# Patient Record
Sex: MALE | Race: WHITE | Employment: FULL TIME | URBAN - METROPOLITAN AREA
[De-identification: names, ages, dates, MRNs, and addresses within clinical notes are randomized per-mention and may not be internally consistent; named-entity substitution may affect disease eponyms.]

---

## 2019-05-31 ENCOUNTER — OFFICE VISIT (OUTPATIENT)
Dept: FAMILY MEDICINE | Facility: CLINIC | Age: 49
End: 2019-05-31
Payer: COMMERCIAL

## 2019-05-31 VITALS
SYSTOLIC BLOOD PRESSURE: 125 MMHG | WEIGHT: 218.13 LBS | HEART RATE: 81 BPM | DIASTOLIC BLOOD PRESSURE: 86 MMHG | HEIGHT: 65 IN | BODY MASS INDEX: 36.34 KG/M2 | OXYGEN SATURATION: 96 % | TEMPERATURE: 98.3 F

## 2019-05-31 DIAGNOSIS — R73.01 IMPAIRED FASTING GLUCOSE: ICD-10-CM

## 2019-05-31 DIAGNOSIS — E66.9 OBESITY, UNSPECIFIED CLASSIFICATION, UNSPECIFIED OBESITY TYPE, UNSPECIFIED WHETHER SERIOUS COMORBIDITY PRESENT: ICD-10-CM

## 2019-05-31 DIAGNOSIS — Z12.5 SCREENING FOR PROSTATE CANCER: ICD-10-CM

## 2019-05-31 DIAGNOSIS — R53.83 FATIGUE, UNSPECIFIED TYPE: ICD-10-CM

## 2019-05-31 DIAGNOSIS — Z91.09 ENVIRONMENTAL ALLERGIES: ICD-10-CM

## 2019-05-31 DIAGNOSIS — G47.00 INSOMNIA, UNSPECIFIED TYPE: ICD-10-CM

## 2019-05-31 DIAGNOSIS — R06.83 SNORING: Primary | ICD-10-CM

## 2019-05-31 LAB — HBA1C MFR BLD: 6.7 % (ref 0–5.6)

## 2019-05-31 PROCEDURE — 83036 HEMOGLOBIN GLYCOSYLATED A1C: CPT | Performed by: FAMILY MEDICINE

## 2019-05-31 PROCEDURE — 84443 ASSAY THYROID STIM HORMONE: CPT | Performed by: FAMILY MEDICINE

## 2019-05-31 PROCEDURE — 36415 COLL VENOUS BLD VENIPUNCTURE: CPT | Performed by: FAMILY MEDICINE

## 2019-05-31 PROCEDURE — 99204 OFFICE O/P NEW MOD 45 MIN: CPT | Performed by: FAMILY MEDICINE

## 2019-05-31 PROCEDURE — G0103 PSA SCREENING: HCPCS | Performed by: FAMILY MEDICINE

## 2019-05-31 PROCEDURE — 82306 VITAMIN D 25 HYDROXY: CPT | Performed by: FAMILY MEDICINE

## 2019-05-31 PROCEDURE — 80053 COMPREHEN METABOLIC PANEL: CPT | Performed by: FAMILY MEDICINE

## 2019-05-31 ASSESSMENT — ANXIETY QUESTIONNAIRES
1. FEELING NERVOUS, ANXIOUS, OR ON EDGE: MORE THAN HALF THE DAYS
5. BEING SO RESTLESS THAT IT IS HARD TO SIT STILL: NOT AT ALL
GAD7 TOTAL SCORE: 11
IF YOU CHECKED OFF ANY PROBLEMS ON THIS QUESTIONNAIRE, HOW DIFFICULT HAVE THESE PROBLEMS MADE IT FOR YOU TO DO YOUR WORK, TAKE CARE OF THINGS AT HOME, OR GET ALONG WITH OTHER PEOPLE: SOMEWHAT DIFFICULT
7. FEELING AFRAID AS IF SOMETHING AWFUL MIGHT HAPPEN: MORE THAN HALF THE DAYS
2. NOT BEING ABLE TO STOP OR CONTROL WORRYING: MORE THAN HALF THE DAYS
3. WORRYING TOO MUCH ABOUT DIFFERENT THINGS: SEVERAL DAYS
6. BECOMING EASILY ANNOYED OR IRRITABLE: SEVERAL DAYS

## 2019-05-31 ASSESSMENT — PATIENT HEALTH QUESTIONNAIRE - PHQ9
SUM OF ALL RESPONSES TO PHQ QUESTIONS 1-9: 9
5. POOR APPETITE OR OVEREATING: NEARLY EVERY DAY

## 2019-05-31 ASSESSMENT — PAIN SCALES - GENERAL: PAINLEVEL: NO PAIN (0)

## 2019-05-31 ASSESSMENT — MIFFLIN-ST. JEOR: SCORE: 1788.48

## 2019-05-31 NOTE — PATIENT INSTRUCTIONS
We will send you lab results    Increase exercise as able    Schedule consult with sleep specialist    Could use occasional melatonin over the counter up to 5 mg    Could use over the counter flonase ( fluticasone ) if having ongoing nasal congestion

## 2019-05-31 NOTE — PROGRESS NOTES
Subjective     Gibson Navarro is a 48 year old male who presents to clinic today for the following health issues:    HPI   Urgent care follow up was seen for lightheaded, he is also concerned about some sleep issues, possible diabetes, allergies, anxiety per patient  none     patient realized not taking great care of self    Vision gets weird    Blurry especially on sides    If not sleeping well, worse           Reviewed and updated as needed this visit by Provider         Review of Systems   Very tired but can't fall asleep    Sometimes panicky    Wondering about diabetes    Stress/ anxiety    To [facility] this last Monday  Almost panicked  [place] in distress    Everything checked out okay    Did not go to work tues or Wed    Did go Thurs and today     for a tech company    Stressful job, but does like it    Still , 2 kids    No exercise    Tries to walk some     On Monday throat sore; maybe swollen? Allergies?    Snoring, not getting worse    Wife has mentioned that he stops breathing sometimes    4-5 hours sleep total in 24 hours    Watching TV is relaxing     Some reading            Objective    There were no vitals taken for this visit.  There is no height or weight on file to calculate BMI.  Physical Exam   Constitutional: He is oriented to person, place, and time. He appears well-developed and well-nourished.   HENT:   Head: Normocephalic and atraumatic.   Right Ear: Tympanic membrane, external ear and ear canal normal.   Left Ear: Tympanic membrane, external ear and ear canal normal.   Nose: Nose normal.   Mouth/Throat: Oropharynx is clear and moist.   No sinus/ submandib tenderness     Eyes: Conjunctivae are normal.   Neck: Carotid bruit is not present.   Cardiovascular: Normal rate, regular rhythm, normal heart sounds and intact distal pulses.   Pulmonary/Chest: Effort normal and breath sounds normal. No respiratory distress.   Abdominal: Soft. He exhibits no distension. There is no tenderness.    Musculoskeletal: He exhibits no edema.   Neurological: He is alert and oriented to person, place, and time. No cranial nerve deficit.   Psychiatric: He has a normal mood and affect. His speech is normal and behavior is normal.      Physical Exam   Constitutional: He is oriented to person, place, and time. He appears well-developed and well-nourished.   HENT:   Head: Normocephalic and atraumatic.   Right Ear: Tympanic membrane, external ear and ear canal normal.   Left Ear: Tympanic membrane, external ear and ear canal normal.   Nose: Nose normal.   Mouth/Throat: Oropharynx is clear and moist.   No sinus/ submandib tenderness     Eyes: Conjunctivae are normal.   Neck: Carotid bruit is not present.   Cardiovascular: Normal rate, regular rhythm, normal heart sounds and intact distal pulses.   Pulmonary/Chest: Effort normal and breath sounds normal. No respiratory distress.   Abdominal: Soft. He exhibits no distension. There is no tenderness.   Musculoskeletal: He exhibits no edema.   Neurological: He is alert and oriented to person, place, and time. No cranial nerve deficit.   Psychiatric: He has a normal mood and affect. His speech is normal and behavior is normal.     Patient has more than average amount of tissue in uvula area and posterior oropharynx      ASSESSMENT / PLAN:  (R06.83) Snoring  (primary encounter diagnosis)  Comment: high suspicion for sleep apnea.  Patient to call and schedule consult.   Plan: SLEEP EVALUATION & MANAGEMENT REFERRAL - [facility]          [phone] (Age 15 and up)             (R53.83) Fatigue, unspecified type  Comment: as above.  Also check some other labs  Plan: SLEEP EVALUATION & MANAGEMENT REFERRAL - [facility]          [phone] (Age 15 and up), TSH with free T4         reflex, Comprehensive metabolic panel, Vitamin         D Deficiency             (R73.01) Impaired fasting  glucose  Comment: clarify overall glycemic picture   Plan: Hemoglobin A1c             (Z12.5) Screening for prostate cancer  Comment: psa   Plan: Prostate spec antigen screen             (E66.9) Obesity, unspecified classification, unspecified obesity type, unspecified whether serious comorbidity present  Comment: keep working on healthy diet/exercise and wt loss  Plan: as above     (Z91.09) Environmental allergies  Comment: patient could try over the counter flonase as needed  Plan: discussed in detail     (G47.00) Insomnia, unspecified type  Comment: could use occasional low dose melatonin as therapeutic trial but still needs to see sleep specialist for likely sleep apnea   Plan: as above       I reviewed the patient's medications, allergies, medical history, family history, and social history.    Mando Barriga MD

## 2019-06-01 ASSESSMENT — ANXIETY QUESTIONNAIRES: GAD7 TOTAL SCORE: 11

## 2019-06-03 LAB
ALBUMIN SERPL-MCNC: 3.9 G/DL (ref 3.4–5)
ALP SERPL-CCNC: 75 U/L (ref 40–150)
ALT SERPL W P-5'-P-CCNC: 37 U/L (ref 0–70)
ANION GAP SERPL CALCULATED.3IONS-SCNC: 10 MMOL/L (ref 3–14)
AST SERPL W P-5'-P-CCNC: 22 U/L (ref 0–45)
BILIRUB SERPL-MCNC: 0.6 MG/DL (ref 0.2–1.3)
BUN SERPL-MCNC: 13 MG/DL (ref 7–30)
CALCIUM SERPL-MCNC: 8.9 MG/DL (ref 8.5–10.1)
CHLORIDE SERPL-SCNC: 105 MMOL/L (ref 94–109)
CO2 SERPL-SCNC: 24 MMOL/L (ref 20–32)
CREAT SERPL-MCNC: 1.02 MG/DL (ref 0.66–1.25)
DEPRECATED CALCIDIOL+CALCIFEROL SERPL-MC: 19 UG/L (ref 20–75)
GFR SERPL CREATININE-BSD FRML MDRD: 86 ML/MIN/{1.73_M2}
GLUCOSE SERPL-MCNC: 130 MG/DL (ref 70–99)
POTASSIUM SERPL-SCNC: 3.9 MMOL/L (ref 3.4–5.3)
PROT SERPL-MCNC: 7.3 G/DL (ref 6.8–8.8)
PSA SERPL-ACNC: 0.75 UG/L (ref 0–4)
SODIUM SERPL-SCNC: 139 MMOL/L (ref 133–144)
TSH SERPL DL<=0.005 MIU/L-ACNC: 3.28 MU/L (ref 0.4–4)

## 2019-07-01 ENCOUNTER — PRE VISIT (OUTPATIENT)
Dept: SLEEP MEDICINE | Facility: CLINIC | Age: 49
End: 2019-07-01

## 2019-07-01 RX ORDER — IBUPROFEN 200 MG
200-400 TABLET ORAL
COMMUNITY
End: 2019-07-08

## 2019-07-01 NOTE — TELEPHONE ENCOUNTER
"  1.  Reason for the visit:  Consult to discuss insomnia and fatigue  2.  Referring provider and clinic name:  Dr. Barriga  [facility]  3.  Previous Sleep Doctor or Pulmonlogist (clinic name)?  None noted  4.  Records, Procedures, Imaging, and Labs (see below)  No records to obtain        All NOTES from previous office visits that pertain to why they are being seen in the Sleep Center    Previous Sleep Studies, Chest CT, Echos and reports that pertain to why they are seeing Sleep Center    All Sleep records that have been done in the last 2 years that pertain to why they are seeing Sleep Center            Are they being seen for continuation of care for Cpap/Bipap/Avap/Trilogy/Dental Device? na    If yes to above Who and Where was Device issued/currently getting supplies from? na    Are you currently on \"Supplemental Oxygen\" during the day or night?   na                                                                                                                                                      Please remind pt to bring Cpap machine and ask to arrive 15 minutes early to appointment due traffic and congestion                                                 5. Pt Sleep Center Packet received Message left asking pt to arrive 30 minutes early to appointment if no packet received.        Yes: \"please make sure that you bring this to your appointment completed, either the doctor will not see you until this completed or you may be asked to reschedule your appointment.\"     No: mail or email to the pt and explain, \"please make sure that you bring this to your appointment completed, either the doctor will not see you until this completed or you may be asked to reschedule your appointment.\"     ~If pt coming early to fill packet out, ask that they come 30 minutes prior to their appointment~     6. Has the pt's medication list been updated and preferred pharmacy added? Yes    7. Has the allergy list been " "reviewed?Yes    \"Thank you for choosing [facility] and we look forward to seeing you at your upcoming appointment\"     "

## 2019-07-07 NOTE — PROGRESS NOTES
CC: I have been asked to see Gibson Navarro who prefers to be called Mark in consultation by Dr. Mando ANDRADE for problems with both snoring, apneas and difficulty with falling and staying asleep.    HPI:Symptoms: Reports a several year history of worsening sleep disordered breathing symptoms that include nightly snoring that is likely louder than talking, waking up with gasping arousals, and witnessed apneas.  Dentist has reported likely grinding of teeth, and gym reports a morning dry throat and seasonally has difficulty with nasal airflow.  All symptoms are worse when supine, denies morning headaches, signs of Orexin deficiency, or RLS.        Schedule:  Bedtime: Workdays: 10-11 30 P, weekends: 11-12 a                                  sleep Latency: 30-45 minutes earlier in night, if late less than 30 minutes  Wakeups: Several times per night,  Return to sleep: 45 minutes or less unless wake up is after 4 or 5 a  Final wakeup time: 6 30-7 a on work week, weekend 9-10 a  Total sleep time: 5 to 6 hours work nights, 6-7 on weekends.    Naps:    With wake ups Mark does report that 1-2 out of 7 nights per week it may take 30 minutes to or more to re-fall at the beginning of the night, returning to sleep may take more than 30 minutes 5-6 out of 7 nights per week.  But with each wake up he will check the clock, he will feel anxious about not sleeping certainly about his function the next day with not sleeping.  On occasion as he is falling asleep he will get this warm feeling in his chest and then is aware of a sudden wake up and he feels anxious.  He will then get out of bed sometimes walk around and try to shake it off.  He will use his phone to check email he may read from his phone or he might watch TV.  Other times with wake ups he will try to distract his mind while lying in bed.  Other skills that he is used for mental distraction include some deep breathing and praying along with increasing his exercising both  doing an episode at lunchtime and then after dinner he has noticed some improvement in reduction of what he calls anxiety at night.  He notes that all of his wake ups are worse if they occur after 5 AM.    Studies: None      Personal, social and Family hx: Mark is  and lives with his wife, and daughters.  Works as a  for a [email or web address], sleep disruption may occur from his bed partner with regard to his snoring but otherwise environment is conducive to sleep, family history is positive for a brother who has been diagnosed with obstructive sleep apnea, alcohol intake is usually reserved for Friday or Saturday night where between 6 and 9 PM he may have 2-5 alcoholic drinks (reports sleep is worse on these nights) no use of other recreational drugs, has used melatonin about a month ago but he felt and fogginess with this, caffeine intake exceeds 3 servings per day but is not within 6 hours of bedtime, Celina Sleepiness Scale today is 4/24, TURNER scale is 20/28 SUSAN-7 scale is 9/21      ...Scanned in sleep consult note reviewed:    ROS 14 point comprehensive review of systems completed negative with exception of the following eyes some changes in vision light to aging, recent diagnosis of diabetes, mental-health anxiety currently not on treatment, is not seeing a counselor.          Allergies:    Allergies   Allergen Reactions     No Known Drug Allergies        Medications:    Current Outpatient Medications   Medication Sig Dispense Refill     zolpidem (AMBIEN) 10 MG tablet Take tablet by mouth 15 minutes prior to sleep, for Sleep Study 1 tablet 0       Problem List:  Patient Active Problem List    Diagnosis Date Noted     Hyperlipidemia with target LDL less than 130 07/16/2014     Priority: Medium     Diagnosis updated by automated process. Provider to review and confirm.       Vitamin D deficiency disease 07/16/2014     Priority: Medium     Cervical radiculopathy 01/25/2012     Priority: Medium     Neck pain  "01/25/2012     Priority: Medium     Conjunctival hemorrhage 08/12/2008     Priority: Medium     Contusion of face, scalp, and neck except eye(s) 05/31/2006     Priority: Medium     Anal fissure 05/26/2006     Priority: Medium        Past Medical/Surgical History:  No past medical history on file.  Past Surgical History:   Procedure Laterality Date     C APPENDECTOMY  2003           Physical Examination:  Vitals: /80   Pulse 69   Resp 16   Ht 1.651 m (5' 5\")   Wt 97.5 kg (215 lb)   SpO2 95%   BMI 35.78 kg/m    BMI= Body mass index is 35.78 kg/m .    Neck Cir (cm): 43 cm    Iliff Total Score 7/8/2019   Total score - Iliff 4       GENERAL APPEARANCE: healthy, alert and no distress  EYES: Eyes grossly normal to inspection  HENT: soft palate dependent, tongue scalloped with base enlarged, nares with dec flow on R, NSdeviation   NECK: thyroid normal to palpation  RESP: lungs clear to auscultation - no rales, rhonchi or wheezes  CV: regular rates and rhythm, normal S1 S2, no S3 or S4 and no murmur, click or rub  Extremities are without clubbing, edema  Musculoskeletal:Moves without difficulty  Mallampati Class: IV.  Tonsillar Stage: 1  hidden by pillars.  Dental: Dentition in good condition.  Good advancement of lower jaw without tmd  Skin: without facial rash    Assessment/Plan: 89-year-old male with a moderate to high pretest probability for obstructive sleep apnea with a stop bang score of 6/8, in the setting of insomnia and anxiety regarding difficulties with re-falling back asleep.  TURNER scale is 20/28.  Due to the difficulty of identifying the total sleep time and one with significant insomnia I have ordered an in lab study due to the improved sensitivity of this testing.    1.  Snoring with witnessed apneas and Il[place] Sleepiness Scale of 4/24 but in TURNER scale of 20/28 (high moderate) with inability to reinitiate sleep especially as events occurred towards the later part of the night.  I have " provided him with Ambien for his nights rest and he will return to see me to review treatment options following his study.  He is open to all treatment options and in fact has several people well-known to him for using CPAP successfully.  2.  Psychophysiologic insomnia with signs and symptoms of clock watching, anxiety, extending time in bed.  I have given him a recommendation at the end of our visit to avoid using devices within the sleep.  And 1/2-hour before as well as to stop clock watching throughout the night in an effort to reduce anxiety and to strengthen his circadian rhythm.  We will discuss these efforts when I see him back in clinic.  3.  Delayed sleep phase disorder he is a committed night all and is occasionally swinging his rise time to more than 3 hours difference from his weekday wake ups.  We will talk further about reducing his daily rise times to have less than 3 hours difference throughout the week.  Further measures to improve his circadian rhythm will be suggested down the road.    Time spent with patient 60 minutes of which greater than 50% was spent in counseling education and coordination of care and a dictation        CC: Mando Barriga

## 2019-07-08 ENCOUNTER — OFFICE VISIT (OUTPATIENT)
Dept: SLEEP MEDICINE | Facility: CLINIC | Age: 49
End: 2019-07-08
Attending: FAMILY MEDICINE
Payer: COMMERCIAL

## 2019-07-08 VITALS
RESPIRATION RATE: 16 BRPM | HEIGHT: 65 IN | WEIGHT: 215 LBS | BODY MASS INDEX: 35.82 KG/M2 | HEART RATE: 69 BPM | SYSTOLIC BLOOD PRESSURE: 118 MMHG | DIASTOLIC BLOOD PRESSURE: 80 MMHG | OXYGEN SATURATION: 95 %

## 2019-07-08 DIAGNOSIS — R06.83 SNORING: Primary | ICD-10-CM

## 2019-07-08 DIAGNOSIS — G47.21 CIRCADIAN RHYTHM SLEEP DISORDER, DELAYED SLEEP PHASE TYPE: ICD-10-CM

## 2019-07-08 DIAGNOSIS — F51.04 PSYCHOPHYSIOLOGICAL INSOMNIA: ICD-10-CM

## 2019-07-08 PROCEDURE — 99215 OFFICE O/P EST HI 40 MIN: CPT | Performed by: NURSE PRACTITIONER

## 2019-07-08 RX ORDER — ZOLPIDEM TARTRATE 10 MG/1
TABLET ORAL
Qty: 1 TABLET | Refills: 0 | Status: SHIPPED | OUTPATIENT
Start: 2019-07-08 | End: 2021-03-23

## 2019-07-08 ASSESSMENT — MIFFLIN-ST. JEOR: SCORE: 1767.11

## 2019-07-08 NOTE — PATIENT INSTRUCTIONS
"MY TREATMENT INFORMATION FOR SLEEP APNEA-  Gibson Navarro    NP: Yue Beasley [place]  SLEEP CENTER :  [place]    MY CONTACT NUMBER:  [phone]  If you need to cancel your sleep study, please call as soon as possible.  If you were prescribed a sleep aid, bring that to the sleep lab.    Please remember: do not drive if sleepy    Don't nap night of study  No clock watch,  No devices within 30 mins before bedtime and with wakeups  Am I having a sleep study at a sleep center?  Make sure you have an appointment for the study before you leave!    Am I having a home sleep study?  Watch this video:  [email or web address]  Please verify your insurance coverage with your insurance carrier    Frequently asked questions:  1. What is Obstructive Sleep Apnea (SAYDA)? SAYDA is the most common type of sleep apnea. Apnea means, \"without breath.\"  Apnea is most often caused by narrowing or collapse of the upper airway as muscles relax during sleep.   Almost everyone has occasional apneas. Most people with sleep apnea have had brief interruptions at night frequently for many years.  The severity of sleep apnea is related to how frequent and severe the events are.   2. What are the consequences of SAYDA? Symptoms include: feeling sleepy during the day, snoring loudly, gasping or stopping of breathing, trouble sleeping, and occasionally morning headaches or heartburn at night.  Sleepiness can be serious and even increase the risk of falling asleep while driving. Other health consequences may include development of high blood pressure and other cardiovascular disease in persons who are susceptible. Untreated SAYDA  can contribute to heart disease, stroke and diabetes.   3. What are the treatment options? In most situations, sleep apnea is a lifelong disease that must be managed with daily therapy. Medications are not effective for sleep apnea and surgery is generally not " considered until other therapies have been tried. Your treatment is your choice . Continuous Positive Airway (CPAP) works right away and is the therapy that is effective in nearly everyone. An oral device to hold your jaw forward is usually the next most reliable option. Other options include postioning devices (to keep you off your back), weight loss, and surgery including a tongue pacing device. There is more detail about some of these options below.    Important tips for using CPAP and similar devices   Know your equipment:  CPAP is continuous positive airway pressure that prevents obstructive sleep apnea by keeping the throat from collapsing while you are sleeping. In most cases, the device is  smart  and can slowly self-adjusts if your throat collapses and keeps a record every day of how well you are treated-this information is available to you and your care team.  BPAP is bilevel positive airway pressure that keeps your throat open and also assists each breath with a pressure boost to maintain adequate breathing.  Special kinds of BPAP are used in patients who have inadequate breathing from lung or heart disease. In most cases, the device is  smart  and can slowly self-adjusts to assist breathing. Like CPAP, the device keeps a record of how well you are treated.  Your mask is your connection to the device. You get to choose what feels most comfortable and the staff will help to make sure if fits. Here: are some examples of the different masks that are available:       Key points to remember on your journey with sleep apnea:  1. Sleep study.  PAP devices often need to be adjusted during a sleep study to show that they are effective and adjusted right.  2. Good tips to remember: Try wearing just the mask during a quiet time during the day so your body adapts to wearing it. A humidifier is recommended for comfort in most cases to prevent drying of your nose and throat. Allergy medication from your provider may  help you if you are having nasal congestion.  3. Getting settled-in. It takes more than one night for most of us to get used to wearing a mask. Try wearing just the mask during a quiet time during the day so your body adapts to wearing it. A humidifier is recommended for comfort in most cases. Our team will work with you carefully on the first day and will be in contact within 4 days and again at 2 and 4 weeks for advice and remote device adjustments. Your therapy is evaluated by the device each day.   4. Use it every night. The more you are able to sleep naturally for 7-8 hours, the more likely you will have good sleep and to prevent health risks or symptoms from sleep apnea. Even if you use it 4 hours it helps. Occasionally all of us are unable to use a medical therapy, in sleep apnea, it is not dangerous to miss one night.   5. Communicate. Call our skilled team on the number provided on the first day if your visit for problems that make it difficult to wear the device. Over 2 out of 3 patients can learn to wear the device long-term with help from our team. Remember to call our team or your sleep providers if you are unable to wear the device as we may have other solutions for those who cannot adapt to mask CPAP therapy. It is recommended that you sleep your sleep provider within the first 3 months and yearly after that if you are not having problems.   6. Use it for your health. We encourage use of CPAP masks during daytime quiet periods to allow your face and brain to adapt to the sensation of CPAP so that it will be a more natural sensation to awaken to at night or during naps. This can be very useful during the first few weeks or months of adapting to CPAP though it does not help medically to wear CPAP during wakefulness and  should not be used as a strategy just to meet guidelines.  7. Take care of your equipment. Make sure you clean your mask and tubing using directions every day and that your filter and  mask are replaced as recommended or if they are not working.     BESIDES CPAP, WHAT OTHER THERAPIES ARE THERE?    Positioning Device  Positioning devices are generally used when sleep apnea is mild and only occurs on your back.This example shows a pillow that straps around the waist. It may be appropriate for those whose sleep study shows milder sleep apnea that occurs primarily when lying flat on one's back. Preliminary studies have shown benefit but effectiveness at home may need to be verified by a home sleep test. These devices are generally not covered by medical insurance.  Positioning Device  Positioning devices are generally used when sleep apnea is mild and only occurs on your back.This example shows a pillow that straps around the waist. It may be appropriate for those whose sleep study shows milder sleep apnea that occurs primarily when lying flat on one's back. Preliminary studies have shown benefit but effectiveness at home may need to be verified by a home sleep test. These devices are generally not covered by medical insurance.    For less expensive options:            GENIAC or Kleo for slumber bump pillow, or backpack w/ chest strap stuffed w/ pillow;  or t shirt w/2 pockets, sew in tennis balls  Examples of devices that maintain sleeping on the back to prevent snoring and mild sleep apnea.    Belt type body positioner  Http://"[employer]zent[employer].ch/    Electronic reminder  Http://nightshifttherapy.com/  Http://[email or web address].[email or web address]/      Oral Appliance  What is oral appliance therapy?  An oral appliance device fits on your teeth at night like a retainer used after having braces. The device is made by a specialized dentist and requires several visits over 1-2 months before a manufactured device is made to fit your teeth and is adjusted to prevent your sleep apnea. Once an oral device is working properly, snoring should be improved. A home sleep test may be recommended at that time if to determine whether the  sleep apnea is adequately treated.       Some things to remember:  -Oral devices are often, but not always, covered by your medical insurance. Be sure to check with your insurance provider.   -If you are referred for oral therapy, you will be given a list of specialized dentists to consider or you may choose to visit the Web site of the American Academy of Dental Sleep Medicine  -Oral devices are less likely to work if you have severe sleep apnea or are extremely overweight.     More detailed information  An oral appliance is a small acrylic device that fits over the upper and lower teeth  (similar to a retainer or a mouth guard). This device slightly moves jaw forward, which moves the base of the tongue forward, opens the airway, improves breathing for effective treat snoring and obstructive sleep apnea in perhaps 7 out of 10 people .  The best working devices are custom-made by a dental device  after a mold is made of the teeth 1, 2, 3.  When is an oral appliance indicated?  Oral appliance therapy is recommended as a first-line treatment for patients with primary snoring, mild sleep apnea, and for patients with moderate sleep apnea who prefer appliance therapy to use of CPAP4, 5. Severity of sleep apnea is determined by sleep testing and is based on the number of respiratory events per hour of sleep.   How successful is oral appliance therapy?  The success rate of oral appliance therapy in patients with mild sleep apnea is 75-80% while in patients with moderate sleep apnea it is 50-70%. The chance of success in patients with severe sleep apnea is 40-50%. The research also shows that oral appliances have a beneficial effect on the cardiovascular health of SAYDA patients at the same magnitude as CPAP therapy7.  Oral appliances should be a second-line treatment in cases of severe sleep apnea, but if not completely successful then a combination therapy utilizing CPAP plus oral appliance therapy may be  effective. Oral appliances tend to be effective in a broad range of patients although studies show that the patients who have the highest success are females, younger patients, those with milder disease, and less severe obesity. 3, 6.   Finding a dentist that practices dental sleep medicine  Specific training is available through the American Academy of Dental Sleep Medicine for dentists interested in working in the field of sleep. To find a dentist who is educated in the field of sleep and the use of oral appliances, near you, visit the Web site of the American Academy of Dental Sleep Medicine.    References  1. Ovidio et al. Objectively measured vs self-reported compliance during oral appliance therapy for sleep-disordered breathing. Chest 2013; 144(5): 9922-3426.  2. Lupe et al. Objective measurement of compliance during oral appliance therapy for sleep-disordered breathing. Thorax 2013; 68(1): 91-96.  3. Verónica, et al. Mandibular advancement devices in 620 men and women with SAYDA and snoring: tolerability and predictors of treatment success. Chest 2004; 125: 1208-8238.  4. Maldonado, et al. Oral appliances for snoring and SAYDA: a review. Sleep 2006; 29: 244-262.  5. Tequila, et al. Oral appliance treatment for SAYDA: an update. J Clin Sleep Med 2014; 10(2): 215-227.  6. Saulo et al. Predictors of OSAH treatment outcome. J Dent Res 2007; 86: 5114-3276.      Weight Loss: 15-30#    Weight loss is a long-term strategy that may improve sleep apnea in some patients.    Weight management is a personal decision and the decision should be based on your interest and the potential benefits.  If you are interested in exploring weight loss strategies, the following discussion covers the impact on weight loss on sleep apnea and the approaches that may be successful.    Being overweight does not necessarily mean you will have health consequences.  Those who have BMI over 35 or over 27 with existing medical  conditions carries greater risk.   Weight loss decreases severity of sleep apnea in most people with obesity. For those with mild obesity who have developed snoring with weight gain, even 15-30 pound weight loss can improve and occasionally eliminate sleep apnea.  Structured and life-long dietary and health habits are necessary to lose weight and keep healthier weight levels.     Though there may be significant health benefits from weight loss, long-term weight loss is very difficult to achieve- studies show success with dietary management in less than 10% of people. In addition, substantial weight loss may require years of dietary control and may be difficult if patients have severe obesity. In these cases, surgical management may be considered.  Finally, older individuals who have tolerated obesity without health complications may be less likely to benefit from weight loss strategies.      [unfilled]    Surgery:    Surgery for obstructive sleep apnea is considered generally only when other therapies fail to work. Surgery may be discussed with you if you are having a difficult time tolerating CPAP and or when there is an abnormal structure that requires surgical correction.  Nose and throat surgeries often enlarge the airway to prevent collapse.  Most of these surgeries create pain for 1-2 weeks and up to half of the most common surgeries are not effective throughout life.  You should carefully discuss the benefits and drawbacks to surgery with your sleep provider and surgeon to determine if it is the best solution for you.   More information  Surgery for SAYDA is directed at areas that are responsible for narrowing or complete obstruction of the airway during sleep.  There are a wide range of procedures available to enlarge and/or stabilize the airway to prevent blockage of breathing in the three major areas where it can occur: the palate, tongue, and nasal regions.  Successful surgical treatment depends on the  accurate identification of the factors responsible for obstructive sleep apnea in each person.  A personalized approach is required because there is no single treatment that works well for everyone.  Because of anatomic variation, consultation with an examination by a sleep surgeon is a critical first step in determining what surgical options are best for each patient.  In some cases, examination during sedation may be recommended in order to guide the selection of procedures.  Patients will be counseled about risks and benefits as well as the typical recovery course after surgery. Surgery is typically not a cure for a person s SAYDA.  However, surgery will often significantly improve one s SAYDA severity (termed  success rate ).  Even in the absence of a cure, surgery will decrease the cardiovascular risk associated with OSA7; improve overall quality of life8 (sleepiness, functionality, sleep quality, etc).      Palate Procedures:  Patients with SAYDA often have narrowing of their airway in the region of their tonsils and uvula.  The goals of palate procedures are to widen the airway in this region as well as to help the tissues resist collapse.  Modern palate procedure techniques focus on tissue conservation and soft tissue rearrangement, rather than tissue removal.  Often the uvula is preserved in this procedure. Residual sleep apnea is common in patient after pharyngoplasty with an average reduction in sleep apnea events of 33%2.      Tongue Procedures:  ExamWhile patients are awake, the muscles that surround the throat are active and keep this region open for breathing. These muscles relax during sleep, allowing the tongue and other structures to collapse and block breathing.  There are several different tongue procedures available.  Selection of a tongue base procedure depends on characteristics seen on physical exam.  Generally, procedures are aimed at removing bulky tissues in this area or preventing the back of  the tongue from falling back during sleep.  Success rates for tongue surgery range from 50-62%3.    Hypoglossal Nerve Stimulation:  Hypoglossal nerve stimulation has recently received approval from the [place] Food and Drug Administration for the treatment of obstructive sleep apnea.  This is based on research showing that the system was safe and effective in treating sleep apnea6.  Results showed that the median AHI score decreased 68%, from 29.3 to 9.0. This therapy uses an implant system that senses breathing patterns and delivers mild stimulation to airway muscles, which keeps the airway open during sleep.  The system consists of three fully implanted components: a small generator (similar in size to a pacemaker), a breathing sensor, and a stimulation lead.  Using a small handheld remote, a patient turns the therapy on before bed and off upon awakening.    Candidates for this device must be greater than 22 years of age, have moderate to severe SAYDA (AHI between 20-65), BMI less than 32, have tried CPAP/oral appliance without success, and have appropriate upper airway anatomy (determined by a sleep endoscopy performed by Dr. Nassar).    Hypoglossal Nerve Stimulation Pathway:    The sleep surgeon s office will work with the patient through the insurance prior-authorization process (including communications and appeals).    Nasal Procedures:  Nasal obstruction can interfere with nasal breathing during the day and night.  Studies have shown that relief of nasal obstruction can improve the ability of some patients to tolerate positive airway pressure therapy for obstructive sleep apnea1.  Treatment options include medications such as nasal saline, topical corticosteroid and antihistamine sprays, and oral medications such as antihistamines or decongestants. Non-surgical treatments can include external nasal dilators for selected patients. If these are not successful by themselves, surgery can improve the nasal  airway either alone or in combination with these other options.      Combination Procedures:  Combination of surgical procedures and other treatments may be recommended, particularly if patients have more than one area of narrowing or persistent positional disease.  The success rate of combination surgery ranges from 66-80%2,3.    References  1. Mati SNIDER. The Role of the Nose in Snoring and Obstructive Sleep Apnoea: An Update.  Eur Arch Otorhinolaryngol. 2011; 268: 1365-73.  2.  Daly SM; Bisi JA; Javier JR; Pallanch JF; Ricky MB; Ivett SG; Zoila KING. Surgical modifications of the upper airway for obstructive sleep apnea in adults: a systematic review and meta-analysis. SLEEP 2010;33(10):7070-0171. Jeffry GOODEN. Hypopharyngeal surgery in obstructive sleep apnea: an evidence-based medicine review.  Arch Otolaryngol Head Neck Surg. 2006 Feb;132(2):206-13.  3. Cuauhtemoc YH1, Jena Y, Alireza BRIAN. The efficacy of anatomically based multilevel surgery for obstructive sleep apnea. Otolaryngol Head Neck Surg. 2003 Oct;129(4):327-35.  4. Jeffry GOODEN, Goldberg A. Hypopharyngeal Surgery in Obstructive Sleep Apnea: An Evidence-Based Medicine Review. Arch Otolaryngol Head Neck Surg. 2006 Feb;132(2):206-13.  5. Alf TURNER et al. Upper-Airway Stimulation for Obstructive Sleep Apnea.  N Engl J Med. 2014 Jan 9;370(2):139-49.  6. Jena Y et al. Increased Incidence of Cardiovascular Disease in Middle-aged Men with Obstructive Sleep Apnea. Am J Respir Crit Care Med; 2002 166: 159-165  7. Means EM et al. Studying Life Effects and Effectiveness of Palatopharyngoplasty (SLEEP) study: Subjective Outcomes of Isolated Uvulopalatopharyngoplasty. Otolaryngol Head Neck Surg. 2011; 144: 623-631.

## 2019-07-10 ENCOUNTER — TRANSFERRED RECORDS (OUTPATIENT)
Dept: HEALTH INFORMATION MANAGEMENT | Facility: CLINIC | Age: 49
End: 2019-07-10

## 2019-07-12 ENCOUNTER — DOCUMENTATION ONLY (OUTPATIENT)
Dept: SLEEP MEDICINE | Facility: CLINIC | Age: 49
End: 2019-07-12

## 2019-07-12 ENCOUNTER — THERAPY VISIT (OUTPATIENT)
Dept: SLEEP MEDICINE | Facility: CLINIC | Age: 49
End: 2019-07-12
Payer: COMMERCIAL

## 2019-07-12 DIAGNOSIS — F51.04 PSYCHOPHYSIOLOGICAL INSOMNIA: ICD-10-CM

## 2019-07-12 DIAGNOSIS — R06.83 SNORING: ICD-10-CM

## 2019-07-12 PROCEDURE — 95811 POLYSOM 6/>YRS CPAP 4/> PARM: CPT | Performed by: INTERNAL MEDICINE

## 2019-07-12 NOTE — Clinical Note
Silvano Turner, patient has moderate SAYDA.  Optimal CPAP titration was achieved.  PSG report in the epic. Thanks, SNig

## 2019-07-13 NOTE — PROGRESS NOTES
Completed a split night PSG per provider order.    Preliminary AHI 30.8.  A final therapeutic PAP pressure was achieved.    Supine REM was seen on therapeutic pressure.    Patient reports feeling refreshed in AM.        ROS      Physical Exam

## 2019-07-15 LAB — SLPCOMP: NORMAL

## 2019-07-15 NOTE — PROCEDURES
" SLEEP STUDY INTERPRETATION  SPLIT NIGHT STUDY      Patient: JUSTIN MANDUJANO  YOB: 1970  Study Date: 7/12/2019  MRN: [id number]  Referring Provider: Mando Barriga MD  Ordering Provider: MATT Ruiz CNP    Indications for Polysomnography: The patient is a 48 y old Male who is 5' 5\" and weighs 215.0 lbs. His BMI is 35.8, [place] sleepiness scale 4.0 and neck circumference is 44.0 cm. Relevant medical history includes hyperlipidemia, Vit D deficiency. Patient reports snoring, witnessed apneas and insomnia. A diagnostic polysomnogram was performed to evaluate for sleep apnea/hypoxemia. After 130.5 minutes of sleep time the patient exhibited sufficient respiratory events qualifying him for a CPAP trial which was then initiated.    Polysomnogram Data: A full night polysomnogram recorded the standard physiologic parameters including EEG, EOG, EMG, ECG, nasal and oral airflow. Respiratory parameters of chest and abdominal movements were recorded with respiratory inductance plethysmography. Oxygen saturation was recorded by pulse oximetry.  Hypopnea scoring rule used: 1B 4%    Diagnostic PSG  Sleep Architecture: Prolonged initial sleep latency despite the use of sleep aid with sleep fragmentation and reduced sleep efficiency.  REM sleep was not observed during the baseline.  The total recording time of the polysomnogram was 178.3 minutes. The total sleep time was 130.5 minutes. Sleep latency was increased at 30.8 minutes with the use of a sleep aid (10 mg zolpidem). REM latency was - minutes. Arousal index was increased at 40.5 arousals per hour. Sleep efficiency was decreased at 73.2%. Wake after sleep onset was 15.0 minutes. The patient spent 12.6% of total sleep time in Stage N1, 71.3% in Stage N2, 16.1% in Stage N3, and 0.0% in REM. Time in REM supine was - minutes.    Respiration: Moderate obstructive sleep apnea, pronounced during sleep in supine position.  Since REM sleep was not observed during " the baseline, it is plausible that the overall severity of the sleep-related breathing disorder may have been underestimated.      Events ? The polysomnogram revealed a presence of 1 obstructive, - central, and 2 mixed apneas resulting in an apnea index of 1.4 events per hour. There were 55 obstructive hypopneas and - central hypopneas resulting in an obstructive hypopnea index of 25.3 and central hypopnea index of - events per hour. The combined apnea/hypopnea index was 26.7 events per hour (central apnea/hypopnea index was - events per hour).  The REM AHI was - events per hour. The supine AHI was 50.2 events per hour. The RERA index was 8.7 events per hour. The RDI was 35.4 events per hour.    Snoring - was reported as mild to moderate.    Respiratory rate and pattern - was notable for normal respiratory rate and pattern.    Sustained Sleep Associated Hypoventilation - Transcutaneous carbon dioxide monitoring  was not used, however significant hypoventilation was not suggested by oximetry.    Sleep Associated Hypoxemia - (Greater than 5 minutes O2 sat at or below 88%) was not present. Baseline oxygen saturation was 92.4%. Lowest oxygen saturation was 81.7%. Time spent less than or equal to 88% was 3.9 minutes. Time spent less than or equal to 89% was 5.0 minutes.     Treatment PSG  Sleep Architecture: With CPAP therapy there was significant reduction in the frequency of arousals and increase sleep efficiency including rebound increase in REM sleep.    At 02:35:47 AM the patient was placed on PAP treatment and was titrated at pressures ranging from CPAP 5 cmH2O up to CPAP 6 cmH2O. The total recording time of the treatment portion of the study was 222.9 minutes. The total sleep time was 210.0 minutes. During the treatment portion of the study the sleep latency was 1.5 minutes. REM latency was 7.0 minutes. Arousal index was normal at 10.9 arousals per hour. Sleep efficiency was increased at 94.2%. Wake after sleep  onset was 10.5 minutes. The patient spent 2.9% of total sleep time in Stage N1, 75.2% in Stage N2, 0.0% in Stage N3, and 21.9% in REM. Time in REM supine was 35.5 minutes.     Respiration: Optimum CPAP titration was achieved with CPAP at pressure setting of 5 cm water.      With CPAP at 5 cm H2O, the obstructive events were eliminated during both NREM and  REM sleep in supine position with residual AHI of 0 events per hour.   However,  due to few respiratory effort related arousals,  the CPAP settings were further increased to 6 cmH2O, but REM sleep was not seen at that pressure setting.     This titration was considered Optimal (residual AHI < 5 events per hour and including REM?supine sleep at final pressure).     Movement Activity: There were no significant limb movements or abnormal sleep related behaviors during the study.    Periodic Limb Movements  o During the diagnostic portion of the study, there were - PLMs recorded. The PLM index was - movements per hour. The PLM Arousal Index was - per hour.  o During the treatment portion of the study, there were - PLMs recorded. The PLM index was - movements per hour. The PLM Arousal Index was - per hour.    REM EMG Activity - Excessive transient/sustained muscle activity was not present.    Nocturnal Behavior - Abnormal sleep related behaviors were not noted during/arising out of NREM / REM sleep.     Bruxism - None apparent.    Cardiac Summary: Normal sinus rhythm was noted  During the diagnostic portion of the study, the average pulse rate was 67.8 bpm. The minimum pulse rate was 54.2 bpm while the maximum pulse rate was 91.2 bpm.    During the treatment portion of the study, the average pulse rate was 64.2 bpm. The minimum pulse rate was 53.1 bpm while the maximum pulse rate was 95.0 bpm.     Arrhythmias were not noted.      Assessment:     Moderate obstructive sleep apnea, pronounced during sleep in supine position.  Since REM sleep was not observed during the  baseline, it is plausible that the overall severity of the sleep-related breathing disorder may have been underestimated. Optimum CPAP titration was achieved with CPAP at pressure setting of 5 cm water.      Sleep architecture during baseline was remarkable for prolonged initial sleep latency despite the use of sleep aid with sleep fragmentation with reduced sleep efficiency.  REM sleep was not observed during the baseline.  With CPAP therapy there was significant reduction in the frequency of arousals and increase sleep efficiency including rebound increase in REM sleep.      There were no significant limb movements.    There were no abnormal sleep related behaviors during the study.    Normal sinus rhythm was noted.      Recommendations:    Treatment of SAYDA with Auto?titrating PAP therapy with a range of 5 cmH2O to 8 cmH2O. Recommend clinical follow up with sleep management team, including review of compliance measures.    Advice regarding the risks of drowsy driving.    Suggest optimizing sleep schedule and avoiding sleep deprivation.    Weight management (if BMI > 30).    Pharmacologic therapy should be used for management of restless legs syndrome only if present and clinically indicated and not based on the presence of periodic limb movements alone.    Diagnostic Codes:   Obstructive Sleep Apnea G47.33  Repetitive Intrusions Into Sleep F51.8      7/12/2019 [facility] Sleep Study (215.0 lbs) - AHI 26.7, RDI 35.4, Supine AHI 50.2, REM AHI -, Low O2% 81.7%, Time Spent ?88% 3.9, Time Spent ?89% 5.0. Treatment was titrated to a pressure of CPAP 6 with an AHI -. Time spent in REM supine at this pressure was - minutes.      _____________________________________   Electronically Signed By: (Raeann Smith MD), 7/15/2019

## 2019-08-01 ENCOUNTER — VIRTUAL VISIT (OUTPATIENT)
Dept: SLEEP MEDICINE | Facility: CLINIC | Age: 49
End: 2019-08-01
Payer: COMMERCIAL

## 2019-08-01 DIAGNOSIS — G47.33 OSA (OBSTRUCTIVE SLEEP APNEA): Primary | ICD-10-CM

## 2019-08-01 PROCEDURE — 98967 PH1 ASSMT&MGMT NQHP 11-20: CPT | Performed by: NURSE PRACTITIONER

## 2019-08-01 NOTE — PROGRESS NOTES
CC: Discussed results of polysomnogram and develop a plan of care    CC:  Gibson Navarro who prefers to be called Mark was seen  in consultation at the request of Dr. Mando Barriga for problems with both snoring, apneas, grinding of teeth and difficulty with falling and staying asleep.    HPI:Symptoms: Reports a several year history of worsening sleep disordered breathing symptoms that include nightly snoring that is likely louder than talking, waking up with gasping arousals, and witnessed apneas.  Dentist has reported likely grinding of teeth.    Comorbid sleep diagnoses: Delayed sleep phase disorder with insufficient sleep Sunday night through the end of the week due to early rise time for work and having irregular rise times of greater than 3 hours difference between work week and weekend.  Also possible psychophysiologic insomnia with excessive time in bed when unable to sleep with worry, clock watching, and leaving bed early if he should have a wake up in the late hours of the morning rather than reinitiating sleep.    7/12/2019 [facility] Study (215.0 lbs) - AHI 26.7, RDI 35.4, Supine AHI 50.2, REM AHI -, Low O2% 81.7%, Time Spent ?88% 3.9, Time Spent ?89% 5.0. Treatment was titrated to a pressure of CPAP 6 with an AHI -. Time spent in REM supine at this pressure was - minutes.    Results of polysomnogram were given to Mark over the phone, and he was encouraged to contact his insurance carrier to determine who he should select for durable medical equipment.  A method to adapt to CPAP was also provided along with follow-up care recommendations should he select [facility] with access to S  services.    Assessment plan:  1.  Moderate obstructive sleep apnea, worse supine, patient to make decisions and contact me through my chart.  Adapting to CPAP may have much to do with his circadian rhythm once his sleep that has been paid back.  He is cautioned to not drive if  sleepy.    Time spent with patient 15 minutes in a scheduled telephone call, of which greater than 50% was comprised of counseling education and coordination of care.

## 2019-08-01 NOTE — PATIENT INSTRUCTIONS
Call [facility] to get an idea of which medical equipment co. to choose and an estimate of cost (your cost-may benefit to ask if there is a difference between using [facility] or [facility] costs.    My chart me with choice, I'll write orders.      If [facility], follow up in 7 wks with me.  Est of coverage of set for cpap and supplies and where.     If [facility],follow up with stm, follow up with me in 7 wks    For starting cpap:  Pick a mask that will support your unwinding activity at night, or better yet, unwind outside of bed, then enter and place cpap.     When you get your equipment, it may help to practice with your cpap when you first get home for work for 10-15 mins-read a book or listen to music.  Then move the cpap to the bedside, place mask on the pillow and you are ready for the night's rest.    If you choose [facility], our sleep therapy management group will follow you closely-please call them back when they contact you with questions you have on adapting to cpap.  They are here to help.    Please don't drive if sleepy, it may take awhile till your sleepiness resolves.    I'll see you back in clinic about 7 wks after your set up.    Please don't drive if sleepy, it may take awhile till your sleepiness resolves.

## 2019-08-03 DIAGNOSIS — G47.33 OSA (OBSTRUCTIVE SLEEP APNEA): Primary | ICD-10-CM

## 2019-08-08 ENCOUNTER — TELEPHONE (OUTPATIENT)
Dept: SLEEP MEDICINE | Facility: CLINIC | Age: 49
End: 2019-08-08

## 2019-08-16 ENCOUNTER — DOCUMENTATION ONLY (OUTPATIENT)
Dept: SLEEP MEDICINE | Facility: CLINIC | Age: 49
End: 2019-08-16

## 2019-08-19 ENCOUNTER — DOCUMENTATION ONLY (OUTPATIENT)
Dept: SLEEP MEDICINE | Facility: CLINIC | Age: 49
End: 2019-08-19

## 2019-08-19 NOTE — PROGRESS NOTES
3 DAY STM VISIT    Diagnostic AHI: 26.7 PSG    Patient contacted for 3 day STM visit  Message left for patient to return call     Device type: Auto-CPAP  PAP settings from order::  CPAP min 5 cm  H20       CPAP max 8 cm  H20  Mask type:    Nasal Mask     Assessment: No profile in [employer].  Interface error corrected.  Action plan: Pt to have f/u 14 day STM visit.  Patient has a follow up visit scheduled:   no.    Total time spent on remote patient monitoring data analysis and patient contact today:   10 minutes

## 2019-09-03 ENCOUNTER — DOCUMENTATION ONLY (OUTPATIENT)
Dept: SLEEP MEDICINE | Facility: CLINIC | Age: 49
End: 2019-09-03

## 2019-09-03 NOTE — PROGRESS NOTES
14  DAY STM VISIT    Diagnostic AHI: 26.7   PSG    Message left for patient to return call     Assessment:   Waiting to patient to call back. Unable to get objective measures at this time due to error in interface.  Have now connected Epic with [employer]     Action plan: waiting for patient to return call.  and pt to have 30 day STM visit.      Device type: Auto-CPAP    PAP settings:      Mask type:  Nasal Mask    Objective measures: 14 day rolling measures          Objective measure goal  Compliance   Goal >70%  Leak   Goal < 24 lpm  AHI  Goal < 5  Usage  Goal >240      Total time spent on remote patient monitoring data analysis and patient contact today:   13 minutes

## 2019-09-17 ENCOUNTER — DOCUMENTATION ONLY (OUTPATIENT)
Dept: SLEEP MEDICINE | Facility: CLINIC | Age: 49
End: 2019-09-17

## 2019-09-17 NOTE — PROGRESS NOTES
30 DAY [facility] VISIT    Diagnostic AHI: 26.7 PSG    Message left for patient to return call     Assessment: Pt meeting objective benchmarks.     Action plan: waiting for patient to return call.  and pt to have 6 month [facility] visit  Patient has scheduled a follow up visit with Yue Piper NP.   Device type: Auto-CPAP  PAP settings: CPAP min 5.0 cm  H20     CPAP max 8.0 cm  H20    95th% pressure 6.8 cm  H20   Mask type:  Nasal Mask  Objective measures: 14 day rolling measures      Compliance  85 %      Leak  12.26 lpm  last  upload      AHI 0.24   last  upload      Average number of minutes 342      Objective measure goal  Compliance   Goal >70%  Leak   Goal < 24 lpm  AHI  Goal < 5  Usage  Goal >240      Total time spent on remote patient monitoring data analysis and patient contact today:   10 minutes

## 2020-02-12 ENCOUNTER — DOCUMENTATION ONLY (OUTPATIENT)
Dept: SLEEP MEDICINE | Facility: CLINIC | Age: 50
End: 2020-02-12
Payer: COMMERCIAL

## 2020-02-12 NOTE — PROGRESS NOTES
6 month [facility] Recheck Visit     Diagnostic AHI: 26.7  PSG    Data only recheck     Assessment: Pt meeting objective benchmarks.  Patient compliance is 84% the last 180 days.  Action plan:   pt to follow up per provider request       Device type: Auto-CPAP  PAP settings: CPAP min 5.0 cm  H20     CPAP max 8.0 cm  H20    95th% pressure 7.3 cm  H20   Objective measures: 14 day rolling measures      Compliance  78 %      Leak  5.06 lpm  last  upload      AHI 0.19   last  upload      Average number of minutes 366      Objective measure goal  Compliance   Goal >70%  Leak   Goal < 24 lpm  AHI  Goal < 5  Usage  Goal >240    Total time spent on accessing, reviewing and interpreting remote patient PAP therapy data:   10 minutes      Total time spent with direct patient communication :   0 minutes

## 2021-03-15 ENCOUNTER — MYC MEDICAL ADVICE (OUTPATIENT)
Dept: FAMILY MEDICINE | Facility: CLINIC | Age: 51
End: 2021-03-15

## 2021-03-15 DIAGNOSIS — E78.5 HYPERLIPIDEMIA LDL GOAL <100: Primary | ICD-10-CM

## 2021-03-15 DIAGNOSIS — R53.83 FATIGUE, UNSPECIFIED TYPE: ICD-10-CM

## 2021-03-15 DIAGNOSIS — R73.01 IMPAIRED FASTING GLUCOSE: ICD-10-CM

## 2021-03-15 DIAGNOSIS — Z12.5 SCREENING FOR PROSTATE CANCER: ICD-10-CM

## 2021-03-15 DIAGNOSIS — E55.9 VITAMIN D DEFICIENCY DISEASE: ICD-10-CM

## 2021-03-15 NOTE — TELEPHONE ENCOUNTER
I put in lab orders    Patient can do fasting lab appointment and then see us in clinic a few days later      Please inform patient    Mando Barriga MD

## 2021-03-17 ENCOUNTER — IMMUNIZATION (OUTPATIENT)
Dept: NURSING | Facility: CLINIC | Age: 51
End: 2021-03-17
Payer: COMMERCIAL

## 2021-03-17 PROCEDURE — 91300 PR COVID VAC PFIZER DIL RECON 30 MCG/0.3 ML IM: CPT

## 2021-03-17 PROCEDURE — 0001A PR COVID VAC PFIZER DIL RECON 30 MCG/0.3 ML IM: CPT

## 2021-03-18 DIAGNOSIS — E78.5 HYPERLIPIDEMIA LDL GOAL <100: ICD-10-CM

## 2021-03-18 DIAGNOSIS — Z12.5 SCREENING FOR PROSTATE CANCER: ICD-10-CM

## 2021-03-18 DIAGNOSIS — R73.01 IMPAIRED FASTING GLUCOSE: ICD-10-CM

## 2021-03-18 DIAGNOSIS — R53.83 FATIGUE, UNSPECIFIED TYPE: ICD-10-CM

## 2021-03-18 DIAGNOSIS — E55.9 VITAMIN D DEFICIENCY DISEASE: ICD-10-CM

## 2021-03-18 LAB
ALBUMIN SERPL-MCNC: 3.7 G/DL (ref 3.4–5)
ALP SERPL-CCNC: 65 U/L (ref 40–150)
ALT SERPL W P-5'-P-CCNC: 25 U/L (ref 0–70)
ANION GAP SERPL CALCULATED.3IONS-SCNC: 7 MMOL/L (ref 3–14)
AST SERPL W P-5'-P-CCNC: 17 U/L (ref 0–45)
BASOPHILS # BLD AUTO: 0 10E9/L (ref 0–0.2)
BASOPHILS NFR BLD AUTO: 0.6 %
BILIRUB SERPL-MCNC: 0.4 MG/DL (ref 0.2–1.3)
BUN SERPL-MCNC: 17 MG/DL (ref 7–30)
CALCIUM SERPL-MCNC: 8.7 MG/DL (ref 8.5–10.1)
CHLORIDE SERPL-SCNC: 107 MMOL/L (ref 94–109)
CHOLEST SERPL-MCNC: 212 MG/DL
CO2 SERPL-SCNC: 24 MMOL/L (ref 20–32)
CREAT SERPL-MCNC: 0.98 MG/DL (ref 0.66–1.25)
DEPRECATED CALCIDIOL+CALCIFEROL SERPL-MC: 40 UG/L (ref 20–75)
DIFFERENTIAL METHOD BLD: NORMAL
EOSINOPHIL # BLD AUTO: 0.1 10E9/L (ref 0–0.7)
EOSINOPHIL NFR BLD AUTO: 2.8 %
ERYTHROCYTE [DISTWIDTH] IN BLOOD BY AUTOMATED COUNT: 14.2 % (ref 10–15)
GFR SERPL CREATININE-BSD FRML MDRD: 88 ML/MIN/{1.73_M2}
GLUCOSE SERPL-MCNC: 125 MG/DL (ref 70–99)
HBA1C MFR BLD: 5.9 % (ref 0–5.6)
HCT VFR BLD AUTO: 46.2 % (ref 40–53)
HDLC SERPL-MCNC: 48 MG/DL
HGB BLD-MCNC: 15.6 G/DL (ref 13.3–17.7)
LDLC SERPL CALC-MCNC: 131 MG/DL
LYMPHOCYTES # BLD AUTO: 1.7 10E9/L (ref 0.8–5.3)
LYMPHOCYTES NFR BLD AUTO: 34 %
MCH RBC QN AUTO: 31.8 PG (ref 26.5–33)
MCHC RBC AUTO-ENTMCNC: 33.8 G/DL (ref 31.5–36.5)
MCV RBC AUTO: 94 FL (ref 78–100)
MONOCYTES # BLD AUTO: 0.6 10E9/L (ref 0–1.3)
MONOCYTES NFR BLD AUTO: 11.3 %
NEUTROPHILS # BLD AUTO: 2.6 10E9/L (ref 1.6–8.3)
NEUTROPHILS NFR BLD AUTO: 51.3 %
NONHDLC SERPL-MCNC: 164 MG/DL
PLATELET # BLD AUTO: 215 10E9/L (ref 150–450)
POTASSIUM SERPL-SCNC: 4.4 MMOL/L (ref 3.4–5.3)
PROT SERPL-MCNC: 7.3 G/DL (ref 6.8–8.8)
PSA SERPL-ACNC: 0.51 UG/L (ref 0–4)
RBC # BLD AUTO: 4.9 10E12/L (ref 4.4–5.9)
SODIUM SERPL-SCNC: 138 MMOL/L (ref 133–144)
T4 FREE SERPL-MCNC: 0.89 NG/DL (ref 0.76–1.46)
TRIGL SERPL-MCNC: 166 MG/DL
TSH SERPL DL<=0.005 MIU/L-ACNC: 4.96 MU/L (ref 0.4–4)
WBC # BLD AUTO: 5 10E9/L (ref 4–11)

## 2021-03-18 PROCEDURE — 80061 LIPID PANEL: CPT | Performed by: FAMILY MEDICINE

## 2021-03-18 PROCEDURE — 36415 COLL VENOUS BLD VENIPUNCTURE: CPT | Performed by: FAMILY MEDICINE

## 2021-03-18 PROCEDURE — 80050 GENERAL HEALTH PANEL: CPT | Performed by: FAMILY MEDICINE

## 2021-03-18 PROCEDURE — 82306 VITAMIN D 25 HYDROXY: CPT | Performed by: FAMILY MEDICINE

## 2021-03-18 PROCEDURE — 83036 HEMOGLOBIN GLYCOSYLATED A1C: CPT | Performed by: FAMILY MEDICINE

## 2021-03-18 PROCEDURE — 84439 ASSAY OF FREE THYROXINE: CPT | Performed by: FAMILY MEDICINE

## 2021-03-18 PROCEDURE — G0103 PSA SCREENING: HCPCS | Performed by: FAMILY MEDICINE

## 2021-03-18 ASSESSMENT — ENCOUNTER SYMPTOMS
COUGH: 0
EYE PAIN: 0
DIZZINESS: 0
HEMATOCHEZIA: 0
HEMATURIA: 0
ABDOMINAL PAIN: 0
DYSURIA: 0
PARESTHESIAS: 0
FEVER: 0
ARTHRALGIAS: 1
PALPITATIONS: 0
HEADACHES: 0
WEAKNESS: 0
DIARRHEA: 0
MYALGIAS: 0
NERVOUS/ANXIOUS: 0
CHILLS: 0
SHORTNESS OF BREATH: 0
HEARTBURN: 0
SORE THROAT: 0
FREQUENCY: 0
NAUSEA: 0
CONSTIPATION: 0
JOINT SWELLING: 0

## 2021-03-18 ASSESSMENT — PATIENT HEALTH QUESTIONNAIRE - PHQ9
SUM OF ALL RESPONSES TO PHQ QUESTIONS 1-9: 10
10. IF YOU CHECKED OFF ANY PROBLEMS, HOW DIFFICULT HAVE THESE PROBLEMS MADE IT FOR YOU TO DO YOUR WORK, TAKE CARE OF THINGS AT HOME, OR GET ALONG WITH OTHER PEOPLE: SOMEWHAT DIFFICULT
SUM OF ALL RESPONSES TO PHQ QUESTIONS 1-9: 10

## 2021-03-19 ASSESSMENT — PATIENT HEALTH QUESTIONNAIRE - PHQ9: SUM OF ALL RESPONSES TO PHQ QUESTIONS 1-9: 10

## 2021-03-23 ENCOUNTER — OFFICE VISIT (OUTPATIENT)
Dept: FAMILY MEDICINE | Facility: CLINIC | Age: 51
End: 2021-03-23
Payer: COMMERCIAL

## 2021-03-23 VITALS
HEIGHT: 66 IN | HEART RATE: 60 BPM | BODY MASS INDEX: 32.22 KG/M2 | SYSTOLIC BLOOD PRESSURE: 112 MMHG | TEMPERATURE: 98 F | WEIGHT: 200.5 LBS | DIASTOLIC BLOOD PRESSURE: 71 MMHG

## 2021-03-23 DIAGNOSIS — G47.00 INSOMNIA, UNSPECIFIED TYPE: ICD-10-CM

## 2021-03-23 DIAGNOSIS — Z00.00 ROUTINE GENERAL MEDICAL EXAMINATION AT A HEALTH CARE FACILITY: Primary | ICD-10-CM

## 2021-03-23 DIAGNOSIS — Z12.11 SCREEN FOR COLON CANCER: ICD-10-CM

## 2021-03-23 PROCEDURE — 99396 PREV VISIT EST AGE 40-64: CPT | Performed by: FAMILY MEDICINE

## 2021-03-23 RX ORDER — ACETAMINOPHEN 500 MG
500-1000 TABLET ORAL EVERY 6 HOURS PRN
COMMUNITY

## 2021-03-23 RX ORDER — FLUTICASONE PROPIONATE 50 MCG
1 SPRAY, SUSPENSION (ML) NASAL DAILY
COMMUNITY

## 2021-03-23 RX ORDER — ACETAMINOPHEN 160 MG
TABLET,DISINTEGRATING ORAL
COMMUNITY

## 2021-03-23 RX ORDER — OMEGA-3 FATTY ACIDS/FISH OIL 300-1000MG
200 CAPSULE ORAL EVERY 4 HOURS PRN
COMMUNITY

## 2021-03-23 RX ORDER — TRAZODONE HYDROCHLORIDE 50 MG/1
50 TABLET, FILM COATED ORAL AT BEDTIME
Qty: 30 TABLET | Refills: 1 | Status: SHIPPED | OUTPATIENT
Start: 2021-03-23 | End: 2021-05-19

## 2021-03-23 RX ORDER — MULTIVITAMIN WITH IRON
1 TABLET ORAL DAILY
COMMUNITY

## 2021-03-23 ASSESSMENT — ENCOUNTER SYMPTOMS
CONSTIPATION: 0
WEAKNESS: 0
DIZZINESS: 0
FEVER: 0
HEMATOCHEZIA: 0
DYSURIA: 0
ABDOMINAL PAIN: 0
SORE THROAT: 0
NAUSEA: 0
HEADACHES: 0
MYALGIAS: 0
COUGH: 0
CHILLS: 0
ARTHRALGIAS: 1
HEARTBURN: 0
PALPITATIONS: 0
NERVOUS/ANXIOUS: 0
JOINT SWELLING: 0
SHORTNESS OF BREATH: 0
PARESTHESIAS: 0
FREQUENCY: 0
HEMATURIA: 0
EYE PAIN: 0
DIARRHEA: 0

## 2021-03-23 ASSESSMENT — PAIN SCALES - GENERAL: PAINLEVEL: NO PAIN (0)

## 2021-03-23 ASSESSMENT — MIFFLIN-ST. JEOR: SCORE: 1709.46

## 2021-03-23 NOTE — PROGRESS NOTES
SUBJECTIVE:   CC: Gibson Navarro is an 50 year old male who presents for preventative health visit.        Patient has been advised of split billing requirements and indicates understanding: Yes  Healthy Habits:     Getting at least 3 servings of Calcium per day:  Yes    Bi-annual eye exam:  Yes    Dental care twice a year:  Yes    Sleep apnea or symptoms of sleep apnea:  Sleep apnea    Diet:  Regular (no restrictions)    Frequency of exercise:  4-5 days/week    Duration of exercise:  30-45 minutes    Taking medications regularly:  Yes    Medication side effects:  Not applicable    PHQ-2 Total Score: 4    Additional concerns today:  Yes               Today's PHQ-2 Score:   PHQ-2 ( 1999 [employer]) 3/18/2021   Q1: Little interest or pleasure in doing things 2   Q2: Feeling down, depressed or hopeless 2   PHQ-2 Score 4   Q1: Little interest or pleasure in doing things More than half the days   Q2: Feeling down, depressed or hopeless More than half the days   PHQ-2 Score 4       Abuse: Current or Past(Physical, Sexual or Emotional)- No  Do you feel safe in your environment? Yes        Social History     Tobacco Use     Smoking status: Never Smoker     Smokeless tobacco: Never Used   Substance Use Topics     Alcohol use: Yes     Comment: twice per week-6 drinks per episode     If you drink alcohol do you typically have >3 drinks per day or >7 drinks per week? Yes       No flowsheet data found.    Last PSA:   PSA   Date Value Ref Range Status   03/18/2021 0.51 0 - 4 ug/L Final     Comment:     Assay Method:  Chemiluminescence using Siemens Vista analyzer       Reviewed orders with patient. Reviewed health maintenance and updated orders accordingly - Yes  Lost wt  Eating right    Reviewed and updated as needed this visit by clinical staff  Tobacco  Allergies  Meds   Med Hx  Surg Hx  Fam Hx  Soc Hx        Reviewed and updated as needed this visit by Provider                Using cpap but still not sleeping well    Hard  "to stay asleep    Tried various over the counter things  Nothing really worked great    4-5 hours of sleep on average      Review of Systems   Constitutional: Negative for chills and fever.   HENT: Positive for congestion. Negative for ear pain, hearing loss and sore throat.    Eyes: Negative for pain and visual disturbance.   Respiratory: Negative for cough and shortness of breath.    Cardiovascular: Negative for chest pain, palpitations and peripheral edema.   Gastrointestinal: Negative for abdominal pain, constipation, diarrhea, heartburn, hematochezia and nausea.   Genitourinary: Negative for discharge, dysuria, frequency, genital sores, hematuria, impotence and urgency.   Musculoskeletal: Positive for arthralgias. Negative for joint swelling and myalgias.   Skin: Negative for rash.   Neurological: Negative for dizziness, weakness, headaches and paresthesias.   Psychiatric/Behavioral: Positive for mood changes. The patient is not nervous/anxious.       extreme stress job    No napping during day    Low back pain, ongoing    Sitting a lot    No standing desk anymore    Working from home  Ready to get back to work    Uses flonase    Allegra off and on    Was down to 190    Rowing machine    OBJECTIVE:   /71 (BP Location: Left arm, Patient Position: Chair, Cuff Size: Adult Regular)   Pulse 60   Temp 98  F (36.7  C) (Oral)   Ht 1.68 m (5' 6.14\")   Wt 90.9 kg (200 lb 8 oz)   BMI 32.22 kg/m      Physical Exam  GENERAL: healthy, alert and no distress  EYES: Eyes grossly normal to inspection, PERRL and conjunctivae and sclerae normal  HENT: ear canals and TM's normal, nose and mouth without ulcers or lesions  NECK: no adenopathy, no asymmetry, masses, or scars and thyroid normal to palpation  RESP: lungs clear to auscultation - no rales, rhonchi or wheezes  CV: regular rate and rhythm, normal S1 S2, no S3 or S4, no murmur, click or rub, no peripheral edema and peripheral pulses strong  ABDOMEN: soft, " "nontender, no hepatosplenomegaly, no masses and bowel sounds normal  MS: no gross musculoskeletal defects noted, no edema  SKIN: no suspicious lesions or rashes  NEURO: Normal strength and tone, mentation intact and speech normal  PSYCH: mentation appears normal, affect normal/bright    Diagnostic Test Results:  Labs reviewed in Epic    ASSESSMENT/PLAN:   Gibson was seen today for physical and health maintenance.    Diagnoses and all orders for this visit:    Routine general medical examination at a health care facility    Screen for colon cancer  -     GASTROENTEROLOGY ADULT REF PROCEDURE ONLY; Future    Insomnia, unspecified type  -     traZODone (DESYREL) 50 MG tablet; Take 1 tablet (50 mg) by mouth At Bedtime    Discussed multiple issues with patient  Did referral for colonoscopy; patient will schedule this  Discussed sleep in detail.  See AVS.  He is extremely short on sleep, so trazodone is reasonable at least short term to improve functionality  Went over recent labs in great detail  No std concerns      Patient has been advised of split billing requirements and indicates understanding: Yes  COUNSELING:   Reviewed preventive health counseling, as reflected in patient instructions       Regular exercise       Healthy diet/nutrition       Vision screening       Family planning       Safe sex practices/STD prevention       Colon cancer screening       Prostate cancer screening    Estimated body mass index is 32.22 kg/m  as calculated from the following:    Height as of this encounter: 1.68 m (5' 6.14\").    Weight as of this encounter: 90.9 kg (200 lb 8 oz).     Weight management plan: Discussed healthy diet and exercise guidelines    He reports that he has never smoked. He has never used smokeless tobacco.      Counseling Resources:  ATP IV Guidelines  Pooled Cohorts Equation Calculator  FRAX Risk Assessment  ICSI Preventive Guidelines  Dietary Guidelines for Americans, 2010  USDA's MyPlate  ASA Prophylaxis  Lung " CA Screening    Mando Barriga MD  [facility]  Answers for HPI/ROS submitted by the patient on 3/18/2021   Annual Exam:  If you checked off any problems, how difficult have these problems made it for you to do your work, take care of things at home, or get along with other people?: Somewhat difficult  PHQ9 TOTAL SCORE: 10

## 2021-03-23 NOTE — PATIENT INSTRUCTIONS
Use 1/2 to 1 tablet of trazodone at night.  Don't have to use every night.    In 1-2 months schedule a virtual phone visit    Read Why We Sleep by Hay Marroquin    Keep working on healthy diet/exercise and wt loss    Schedule colonoscopy

## 2021-04-07 ENCOUNTER — IMMUNIZATION (OUTPATIENT)
Dept: NURSING | Facility: CLINIC | Age: 51
End: 2021-04-07
Attending: FAMILY MEDICINE
Payer: COMMERCIAL

## 2021-04-07 PROCEDURE — 91300 PR COVID VAC PFIZER DIL RECON 30 MCG/0.3 ML IM: CPT

## 2021-04-07 PROCEDURE — 0002A PR COVID VAC PFIZER DIL RECON 30 MCG/0.3 ML IM: CPT

## 2021-05-16 DIAGNOSIS — G47.00 INSOMNIA, UNSPECIFIED TYPE: ICD-10-CM

## 2021-05-16 NOTE — LETTER
[facility]  [address] [address]  [phone]          May 19, 2021    Gibson Navarro                                                                                                                     [address] [facility] [address]            Dear Gibson,    Your provider has sent a  dina refill of Trazodone. You are due for an appointment for further refills.  Please contact the clinic to schedule an appointment for further refills.             Sincerely,       Team Brandon Barriga MD

## 2021-05-19 RX ORDER — TRAZODONE HYDROCHLORIDE 50 MG/1
TABLET, FILM COATED ORAL
Qty: 30 TABLET | Refills: 0 | Status: SHIPPED | OUTPATIENT
Start: 2021-05-19 | End: 2021-06-23

## 2021-05-19 NOTE — TELEPHONE ENCOUNTER
Medication is being filled for 1 time refill only due to:  Patient needs to be seen because due for virtual visit, per 3/23 visit note. Has not followed up.     Cecilia Meyer, RN, BSN, PHN  [facility]: [place]

## 2021-06-12 DIAGNOSIS — G47.00 INSOMNIA, UNSPECIFIED TYPE: ICD-10-CM

## 2021-06-21 NOTE — TELEPHONE ENCOUNTER
Attempt 2, called patient and left VM to call clinic. Please help schedule med [facility] appointment if patient returns call. Can be virtual.  Keely RILEY CMA ([facility])

## 2021-06-23 RX ORDER — TRAZODONE HYDROCHLORIDE 50 MG/1
TABLET, FILM COATED ORAL
Qty: 30 TABLET | Refills: 0 | Status: SHIPPED | OUTPATIENT
Start: 2021-06-23 | End: 2021-06-28

## 2021-06-23 NOTE — TELEPHONE ENCOUNTER
Prescription approved per [facility] Refill Protocol.    Ami Mayes RN

## 2021-06-28 ENCOUNTER — VIRTUAL VISIT (OUTPATIENT)
Dept: FAMILY MEDICINE | Facility: CLINIC | Age: 51
End: 2021-06-28
Payer: COMMERCIAL

## 2021-06-28 DIAGNOSIS — G47.00 INSOMNIA, UNSPECIFIED TYPE: ICD-10-CM

## 2021-06-28 DIAGNOSIS — F41.9 ANXIETY: Primary | ICD-10-CM

## 2021-06-28 PROCEDURE — 99213 OFFICE O/P EST LOW 20 MIN: CPT | Mod: GT | Performed by: FAMILY MEDICINE

## 2021-06-28 NOTE — PROGRESS NOTES
Mark is a 50 year old who is being evaluated via a billable video visit.      How would you like to obtain your AVS? MyChart  If the video visit is dropped, the invitation should be resent by:   Will anyone else be joining your video visit? No     Video Start Time: 11:46         Subjective   Mark is a 50 year old who presents for the following health issues    HPI     Patient request to discuss Cannabis use for sleep       Review of Systems   Trazodone didn't help much     Stopped taking it    No side effects    Wondered about medical marijuana    Read the book Why We Sleep    Has cpap  Seems to working fine    Able to sleep on back    Very few events    Exercise and losing wt    Using the cpap nightly    Not staying asleep    Getting about 6 hours of sleep at night    No naps    Melatonin didn't do much    Feels anxious or stressed in general    cbd pm 500 mg tried    Anxiety may be worse Sunday night            Objective           Vitals:  No vitals were obtained today due to virtual visit.    Physical Exam   GENERAL: Healthy, alert and no distress  EYES: Eyes grossly normal to inspection.  No discharge or erythema, or obvious scleral/conjunctival abnormalities.  RESP: No audible wheeze, cough, or visible cyanosis.  No visible retractions or increased work of breathing.    SKIN: Visible skin clear. No significant rash, abnormal pigmentation or lesions.  NEURO: Cranial nerves grossly intact.  Mentation and speech appropriate for age.  PSYCH: Mentation appears normal, affect normal/bright, judgement and insight intact, normal speech and appearance well-groomed.       ASSESSMENT / PLAN:  (F41.9) Anxiety  (primary encounter diagnosis)  Comment: discussed in detail with patient.  At this point agreed thc not great option.  Continue cpap.  Trial of low dose sertraline.  Discussed in detail.  Follow up in 1-2 months with another virtual visit. Call sooner if needed.   Plan: sertraline (ZOLOFT) 50 MG tablet              (G47.00) Insomnia, unspecified type  Comment: as above   Plan: as above  Took trazodone off list       I reviewed the patient's medications, allergies, medical history, family history, and social history.    Mando Barriga MD              Video-Visit Details    Type of service:  Video Visit    Video End Time:12:05 PM    Originating Location (pt. Location): Home    Distant Location (provider location):  [facility]     Platform used for Video Visit: [employer]

## 2022-03-04 ENCOUNTER — TRANSFERRED RECORDS (OUTPATIENT)
Dept: HEALTH INFORMATION MANAGEMENT | Facility: CLINIC | Age: 52
End: 2022-03-04
Payer: COMMERCIAL

## 2022-03-15 ENCOUNTER — TRANSFERRED RECORDS (OUTPATIENT)
Dept: HEALTH INFORMATION MANAGEMENT | Facility: CLINIC | Age: 52
End: 2022-03-15
Payer: COMMERCIAL

## 2023-06-19 NOTE — RESULT ENCOUNTER NOTE
The hemoglobin a1c shows you have very mild diabetes.  No medication needed.    Just keep working on healthy diet/exercise and weight loss.    The vitamin D level is a little low. Advise taking an extra 2000 units of over the counter vitamin D daily.    Other labs are okay.    Mando Barriga MD      
Resident

## 2023-08-08 ENCOUNTER — TRANSFERRED RECORDS (OUTPATIENT)
Dept: HEALTH INFORMATION MANAGEMENT | Facility: CLINIC | Age: 53
End: 2023-08-08
Payer: COMMERCIAL

## 2023-08-10 ENCOUNTER — TRANSFERRED RECORDS (OUTPATIENT)
Dept: HEALTH INFORMATION MANAGEMENT | Facility: CLINIC | Age: 53
End: 2023-08-10
Payer: COMMERCIAL

## 2024-08-12 ENCOUNTER — TRANSFERRED RECORDS (OUTPATIENT)
Dept: HEALTH INFORMATION MANAGEMENT | Facility: CLINIC | Age: 54
End: 2024-08-12
Payer: COMMERCIAL

## 2024-08-21 ENCOUNTER — TRANSFERRED RECORDS (OUTPATIENT)
Dept: HEALTH INFORMATION MANAGEMENT | Facility: CLINIC | Age: 54
End: 2024-08-21
Payer: COMMERCIAL

## 2025-01-13 ENCOUNTER — OFFICE VISIT (OUTPATIENT)
Dept: FAMILY MEDICINE | Facility: CLINIC | Age: 55
End: 2025-01-13
Payer: COMMERCIAL

## 2025-01-13 VITALS
HEIGHT: 66 IN | TEMPERATURE: 97.1 F | WEIGHT: 221.5 LBS | OXYGEN SATURATION: 97 % | RESPIRATION RATE: 16 BRPM | DIASTOLIC BLOOD PRESSURE: 74 MMHG | HEART RATE: 80 BPM | SYSTOLIC BLOOD PRESSURE: 127 MMHG | BODY MASS INDEX: 35.6 KG/M2

## 2025-01-13 DIAGNOSIS — R53.83 FATIGUE, UNSPECIFIED TYPE: ICD-10-CM

## 2025-01-13 DIAGNOSIS — R73.01 IMPAIRED FASTING GLUCOSE: ICD-10-CM

## 2025-01-13 DIAGNOSIS — Z12.5 SCREENING FOR PROSTATE CANCER: ICD-10-CM

## 2025-01-13 DIAGNOSIS — E78.5 HYPERLIPIDEMIA LDL GOAL <100: ICD-10-CM

## 2025-01-13 DIAGNOSIS — H10.31 ACUTE CONJUNCTIVITIS OF RIGHT EYE, UNSPECIFIED ACUTE CONJUNCTIVITIS TYPE: ICD-10-CM

## 2025-01-13 DIAGNOSIS — Z00.00 HEALTHCARE MAINTENANCE: ICD-10-CM

## 2025-01-13 DIAGNOSIS — H10.11 ALLERGIC CONJUNCTIVITIS, RIGHT: Primary | ICD-10-CM

## 2025-01-13 DIAGNOSIS — E55.9 VITAMIN D DEFICIENCY DISEASE: ICD-10-CM

## 2025-01-13 PROCEDURE — 99203 OFFICE O/P NEW LOW 30 MIN: CPT | Performed by: FAMILY MEDICINE

## 2025-01-13 RX ORDER — KETOTIFEN FUMARATE 0.35 MG/ML
1 SOLUTION/ DROPS OPHTHALMIC 2 TIMES DAILY PRN
Qty: 10 ML | Refills: 0 | Status: SHIPPED | OUTPATIENT
Start: 2025-01-13

## 2025-01-13 RX ORDER — POLYMYXIN B SULFATE AND TRIMETHOPRIM 1; 10000 MG/ML; [USP'U]/ML
1-2 SOLUTION OPHTHALMIC 4 TIMES DAILY
Qty: 10 ML | Refills: 0 | Status: SHIPPED | OUTPATIENT
Start: 2025-01-13 | End: 2025-01-20

## 2025-01-13 ASSESSMENT — PAIN SCALES - GENERAL: PAINLEVEL_OUTOF10: NO PAIN (0)

## 2025-01-13 ASSESSMENT — ENCOUNTER SYMPTOMS: EYE PAIN: 1

## 2025-01-13 NOTE — PROGRESS NOTES
"  Had stye like lesion right eye, went away with warm compresses    Both eyes watering    Right lids bothering    No history of lid issues    No contacts    Wears glasses for distance    Maybe need close up glasses    No fever/ chills    No cough/ sneeze    No vision change abruptly but gradual worsening     Amount of redness about the same as yest    No increase runny nose/ sneezing        Subjective   Mark is a 54 year old, presenting for the following health issues:  Eye Problem (Right eye red, hard, pain)        1/13/2025     1:21 PM   Additional Questions   Roomed by Karolina Hudson     Eye Problem     History of Present Illness       Reason for visit:  Eyelid swollen, some pain  Symptom onset:  1-3 days ago  Symptoms include:  Eyelid swollen, red, watery, some pain  Symptom intensity:  Mild  Symptom progression:  Staying the same  Had these symptoms before:  Yes  Has tried/received treatment for these symptoms:  No  What makes it better:  Warm compress, ibuprofen   He is taking medications regularly.        Objective    /74 (BP Location: Left arm, Patient Position: Chair, Cuff Size: Adult Large)   Pulse 80   Temp 97.1  F (36.2  C) (Temporal)   Resp 16   Ht 1.679 m (5' 6.1\")   Wt 100.5 kg (221 lb 8 oz)   SpO2 97%   BMI 35.64 kg/m    Body mass index is 35.64 kg/m .  Physical Exam       Full physical not done     Mentation and affect are fine    No tremor of speech or extremity    Tympanic membranes and canals fine    Nose and throat normal    No sinus/ submandib tenderness    Patient has mild redness and minimal swelling of right upper eyelid    Lower lid right and both lids left normal    No focal stye    Only slight redness of conjuctivae bilat    No drainage/ crusting    No evidence of cellulitis    Eom intact    Perrl         ASSESSMENT / PLAN:  (H10.11) Allergic conjunctivitis, right  (primary encounter diagnosis)  Comment: patient can use moisturizing drops and then add allergy drops   Plan: " ketotifen fumarate 0.035% 0.035 % SOLN         ophthalmic solution             (H10.31) Acute conjunctivitis of right eye, unspecified acute conjunctivitis type  Comment: this is unlikely but if infectious pinkeye type symptoms occur patient could just fill this printed prescription   Plan: polymixin b-trimethoprim (POLYTRIM) 36486-3.1         UNIT/ML-% ophthalmic solution          I did advise patient schedule eye exam, due for this anyway    (Z12.5) Screening for prostate cancer  Comment: patient to schedule  fasting lab appointment then see us for physical a few days later   Plan: Prostate Specific Antigen Screen             (R73.01) Impaired fasting glucose  Comment: check   Plan: Hemoglobin A1c             (E55.9) Vitamin D deficiency disease  Comment: check  Plan: Vitamin D Deficiency             (E78.5) Hyperlipidemia LDL goal <100  Comment: check fasting   Plan: Lipid panel reflex to direct LDL Fasting,         Comprehensive metabolic panel             (R53.83) Fatigue, unspecified type  Comment: check   Plan: CBC with Platelets & Differential, TSH with         free T4 reflex             (Z00.00) Healthcare maintenance  Comment: patient  to schedule   Plan: Adult Eye  Referral               I reviewed the patient's medications, allergies, medical history, family history, and social history.    Mando Barriga MD          Signed Electronically by: Mando Barriga MD

## 2025-01-13 NOTE — PATIENT INSTRUCTIONS
Use the ketotifen eye drop twice daily     Could also use plain moisturizer drop    If infection/ pinkeye symptoms develop, then could fill prescription for antibiotic drop    Schedule eye exam    Schedule fasting lab appointment and physical

## 2025-02-02 SDOH — HEALTH STABILITY: PHYSICAL HEALTH: ON AVERAGE, HOW MANY DAYS PER WEEK DO YOU ENGAGE IN MODERATE TO STRENUOUS EXERCISE (LIKE A BRISK WALK)?: 3 DAYS

## 2025-02-02 SDOH — HEALTH STABILITY: PHYSICAL HEALTH: ON AVERAGE, HOW MANY MINUTES DO YOU ENGAGE IN EXERCISE AT THIS LEVEL?: 20 MIN

## 2025-02-02 ASSESSMENT — SOCIAL DETERMINANTS OF HEALTH (SDOH): HOW OFTEN DO YOU GET TOGETHER WITH FRIENDS OR RELATIVES?: ONCE A WEEK

## 2025-02-05 ENCOUNTER — OFFICE VISIT (OUTPATIENT)
Dept: FAMILY MEDICINE | Facility: CLINIC | Age: 55
End: 2025-02-05
Payer: COMMERCIAL

## 2025-02-05 VITALS
WEIGHT: 216.5 LBS | OXYGEN SATURATION: 97 % | DIASTOLIC BLOOD PRESSURE: 78 MMHG | BODY MASS INDEX: 34.79 KG/M2 | HEIGHT: 66 IN | RESPIRATION RATE: 16 BRPM | HEART RATE: 77 BPM | TEMPERATURE: 97.8 F | SYSTOLIC BLOOD PRESSURE: 123 MMHG

## 2025-02-05 DIAGNOSIS — E78.5 HYPERLIPIDEMIA LDL GOAL <100: ICD-10-CM

## 2025-02-05 DIAGNOSIS — E55.9 VITAMIN D DEFICIENCY DISEASE: ICD-10-CM

## 2025-02-05 DIAGNOSIS — Z23 ENCOUNTER FOR IMMUNIZATION: ICD-10-CM

## 2025-02-05 DIAGNOSIS — Z00.00 ROUTINE GENERAL MEDICAL EXAMINATION AT A HEALTH CARE FACILITY: Primary | ICD-10-CM

## 2025-02-05 DIAGNOSIS — E11.9 TYPE 2 DIABETES MELLITUS WITHOUT COMPLICATION, WITHOUT LONG-TERM CURRENT USE OF INSULIN (H): ICD-10-CM

## 2025-02-05 PROCEDURE — 99214 OFFICE O/P EST MOD 30 MIN: CPT | Mod: 25 | Performed by: FAMILY MEDICINE

## 2025-02-05 PROCEDURE — 90471 IMMUNIZATION ADMIN: CPT | Performed by: FAMILY MEDICINE

## 2025-02-05 PROCEDURE — 99396 PREV VISIT EST AGE 40-64: CPT | Mod: 25 | Performed by: FAMILY MEDICINE

## 2025-02-05 PROCEDURE — 90715 TDAP VACCINE 7 YRS/> IM: CPT | Performed by: FAMILY MEDICINE

## 2025-02-05 PROCEDURE — G2211 COMPLEX E/M VISIT ADD ON: HCPCS | Performed by: FAMILY MEDICINE

## 2025-02-05 RX ORDER — METFORMIN HYDROCHLORIDE 500 MG/1
1000 TABLET, EXTENDED RELEASE ORAL
Qty: 60 TABLET | Refills: 3 | Status: SHIPPED | OUTPATIENT
Start: 2025-02-05

## 2025-02-05 RX ORDER — ATORVASTATIN CALCIUM 10 MG/1
10 TABLET, FILM COATED ORAL DAILY
Qty: 30 TABLET | Refills: 2 | Status: SHIPPED | OUTPATIENT
Start: 2025-02-05

## 2025-02-05 RX ORDER — ASPIRIN 81 MG/1
81 TABLET ORAL DAILY
COMMUNITY
Start: 2025-02-05

## 2025-02-05 ASSESSMENT — PAIN SCALES - GENERAL: PAINLEVEL_OUTOF10: NO PAIN (0)

## 2025-02-05 NOTE — PROGRESS NOTES
"Preventive Care Visit  [facility] [place]  Mando Barriga MD, Family Medicine  Feb 5, 2025      Assessment & Plan     (Z00.00) Routine general medical examination at a health care facility  (primary encounter diagnosis)  Comment: went over recent labs in great detail  Plan: keep working on healthy diet/exercise and wt loss     (Z23) Encounter for immunization  Comment: needs   Plan: TDAP 10-64Y (ADACEL,BOOSTRIX), SCREENING         QUESTIONS FOR ADULT IMMUNIZATIONS        Givven     (E11.9) Type 2 diabetes mellitus without complication, without long-term current use of insulin (H)  Comment: discussed in detail.  Start metformin.  Keep working on healthy diet/exercise and wt loss.  Advised he see diab ed.  See us in about 3 month for recheck.   Plan: metFORMIN (GLUCOPHAGE XR) 500 MG 24 hr tablet,         Adult Diabetes Education  Referral             (E78.5) Hyperlipidemia LDL goal <100  Comment: once stable on the metformin, could start the statin prescription I printed  Plan: atorvastatin (LIPITOR) 10 MG tablet           Also advise he start one 81 mg asa daily     (E55.9) Vitamin D deficiency disease  Comment: on vit d, level fine   Plan: no change             BMI  Estimated body mass index is 34.94 kg/m  as calculated from the following:    Height as of this encounter: 1.676 m (5' 6\").    Weight as of this encounter: 98.2 kg (216 lb 8 oz).   Weight management plan: Discussed healthy diet and exercise guidelines    Counseling  Appropriate preventive services were addressed with this patient via screening, questionnaire, or discussion as appropriate for fall prevention, nutrition, physical activity, Tobacco-use cessation, social engagement, weight loss and cognition.  Checklist reviewing preventive services available has been given to the patient.  Reviewed patient's diet, addressing concerns and/or questions.   He is at risk for lack of exercise and has been provided with information to " increase physical activity for the benefit of his well-being.   He is at risk for psychosocial distress and has been provided with information to reduce risk.            Subjective   Mark is a 54 year old, presenting for the following:  Physical (Fasting)        2/5/2025     9:19 AM   Additional Questions   Roomed by Karolina Hudson          [facility]  Eye appointment tomorrow    The recent eye problem resolved     Did not have to use antibiotic drop      Health Care Directive  Patient does not have a Health Care Directive: Discussed advance care planning with patient; however, patient declined at this time.      2/2/2025   General Health   How would you rate your overall physical health? (!) FAIR   Feel stress (tense, anxious, or unable to sleep) To some extent   (!) STRESS CONCERN      2/2/2025   Nutrition   Three or more servings of calcium each day? Yes   Diet: Regular (no restrictions)   How many servings of fruit and vegetables per day? (!) 0-1   How many sweetened beverages each day? 0-1         2/2/2025   Exercise   Days per week of moderate/strenous exercise 3 days   Average minutes spent exercising at this level 20 min         2/2/2025   Social Factors   Frequency of gathering with friends or relatives Once a week   Worry food won't last until get money to buy more No   Food not last or not have enough money for food? No   Do you have housing? (Housing is defined as stable permanent housing and does not include staying ouside in a car, in a tent, in an abandoned building, in an overnight shelter, or couch-surfing.) Yes   Are you worried about losing your housing? No   Lack of transportation? No   Unable to get utilities (heat,electricity)? No         2/2/2025   Fall Risk   Fallen 2 or more times in the past year? No   Trouble with walking or balance? No          2/2/2025   Dental   Dentist two times every year? Yes         2/2/2025   TB Screening   Were you born outside of the [place]? No           Today's PHQ-2 Score:  "      2/5/2025     9:23 AM   PHQ-2 ( 1999 [employer])   Q1: Little interest or pleasure in doing things 0   Q2: Feeling down, depressed or hopeless 0   PHQ-2 Score 0         2/2/2025   Substance Use   Alcohol more than 3/day or more than 7/wk No   Do you use any other substances recreationally? (!) CANNABIS PRODUCTS     Social History     Tobacco Use    Smoking status: Never     Passive exposure: Never    Smokeless tobacco: Never   Vaping Use    Vaping status: Never Used   Substance Use Topics    Alcohol use: Yes     Comment: twice per week-6 drinks per episode    Drug use: No             2/2/2025   One time HIV Screening   Previous HIV test? I don't know         2/2/2025   STI Screening   New sexual partner(s) since last STI/HIV test? No   Last PSA:   PSA   Date Value Ref Range Status   03/18/2021 0.51 0 - 4 ug/L Final     Comment:     Assay Method:  Chemiluminescence using Siemens Vista analyzer     Prostate Specific Antigen Screen   Date Value Ref Range Status   01/31/2025 0.58 0.00 - 3.50 ng/mL Final     ASCVD Risk   The 10-year ASCVD risk score (Hansel MADERA, et al., 2019) is: 7.9%    Values used to calculate the score:      Age: 54 years      Sex: Male      Is Non- : No      Diabetic: No      Tobacco smoker: No      Systolic Blood Pressure: 123 mmHg      Is BP treated: No      HDL Cholesterol: 43 mg/dL      Total Cholesterol: 256 mg/dL           Reviewed and updated as needed this visit by Provider                        Dad has diab neuropathy    Lots of polyps in patient and family    To have another colonoscopy this summer          Objective    Exam  /78 (BP Location: Right arm, Patient Position: Chair, Cuff Size: Adult Large)   Pulse 77   Temp 97.8  F (36.6  C) (Temporal)   Resp 16   Ht 1.676 m (5' 6\")   Wt 98.2 kg (216 lb 8 oz)   SpO2 97%   BMI 34.94 kg/m     Estimated body mass index is 34.94 kg/m  as calculated from the following:    Height as of this encounter: " "1.676 m (5' 6\").    Weight as of this encounter: 98.2 kg (216 lb 8 oz).    Physical Exam  GENERAL: alert and no distress  EYES: Eyes grossly normal to inspection, PERRL and conjunctivae and sclerae normal  HENT: ear canals and TM's normal, nose and mouth without ulcers or lesions  NECK: no adenopathy, no asymmetry, masses, or scars  RESP: lungs clear to auscultation - no rales, rhonchi or wheezes  CV: regular rate and rhythm, normal S1 S2, no S3 or S4, no murmur, click or rub, no peripheral edema  ABDOMEN: soft, nontender, no hepatosplenomegaly, no masses and bowel sounds normal  MS: no gross musculoskeletal defects noted, no edema  SKIN: no suspicious lesions or rashes  NEURO: Normal strength and tone, mentation intact and speech normal  PSYCH: mentation appears normal, affect normal/bright        Signed Electronically by: Mando Barriga MD    "

## 2025-02-05 NOTE — PATIENT INSTRUCTIONS
Start metformin one pill daily, then in few weeks can go to two daily    Diet/ exercise/ weight loss    Can see diabetes educator    Start low dose aspirin daily     Could start cholesterol pill daily    See us  in about 3 months                  Patient Education   Preventive Care Advice   This is general advice given by our system to help you stay healthy. However, your care team may have specific advice just for you. Please talk to your care team about your preventive care needs.  Nutrition  Eat 5 or more servings of fruits and vegetables each day.  Try wheat bread, brown rice and whole grain pasta (instead of white bread, rice, and pasta).  Get enough calcium and vitamin D. Check the label on foods and aim for 100% of the RDA (recommended daily allowance).  Lifestyle  Exercise at least 150 minutes each week  (30 minutes a day, 5 days a week).  Do muscle strengthening activities 2 days a week. These help control your weight and prevent disease.  No smoking.  Wear sunscreen to prevent skin cancer.  Have a dental exam and cleaning every 6 months.  Yearly exams  See your health care team every year to talk about:  Any changes in your health.  Any medicines your care team has prescribed.  Preventive care, family planning, and ways to prevent chronic diseases.  Shots (vaccines)   HPV shots (up to age 26), if you've never had them before.  Hepatitis B shots (up to age 59), if you've never had them before.  COVID-19 shot: Get this shot when it's due.  Flu shot: Get a flu shot every year.  Tetanus shot: Get a tetanus shot every 10 years.  Pneumococcal, hepatitis A, and RSV shots: Ask your care team if you need these based on your risk.  Shingles shot (for age 50 and up)  General health tests  Diabetes screening:  Starting at age 35, Get screened for diabetes at least every 3 years.  If you are younger than age 35, ask your care team if you should be screened for diabetes.  Cholesterol test: At age 39, start having a  cholesterol test every 5 years, or more often if advised.  Bone density scan (DEXA): At age 50, ask your care team if you should have this scan for osteoporosis (brittle bones).  Hepatitis C: Get tested at least once in your life.  STIs (sexually transmitted infections)  Before age 24: Ask your care team if you should be screened for STIs.  After age 24: Get screened for STIs if you're at risk. You are at risk for STIs (including HIV) if:  You are sexually active with more than one person.  You don't use condoms every time.  You or a partner was diagnosed with a sexually transmitted infection.  If you are at risk for HIV, ask about PrEP medicine to prevent HIV.  Get tested for HIV at least once in your life, whether you are at risk for HIV or not.  Cancer screening tests  Cervical cancer screening: If you have a cervix, begin getting regular cervical cancer screening tests starting at age 21.  Breast cancer scan (mammogram): If you've ever had breasts, begin having regular mammograms starting at age 40. This is a scan to check for breast cancer.  Colon cancer screening: It is important to start screening for colon cancer at age 45.  Have a colonoscopy test every 10 years (or more often if you're at risk) Or, ask your provider about stool tests like a FIT test every year or Cologuard test every 3 years.  To learn more about your testing options, visit:   .  For help making a decision, visit:   https://bit.ly/mt74458.  Prostate cancer screening test: If you have a prostate, ask your care team if a prostate cancer screening test (PSA) at age 55 is right for you.  Lung cancer screening: If you are a current or former smoker ages 50 to 80, ask your care team if ongoing lung cancer screenings are right for you.  For informational purposes only. Not to replace the advice of your health care provider. Copyright   2023 [employer]. All rights reserved. Clinically reviewed by the [facility] Transitions  Program. [employer] 692110 - REV 01/24.  Learning About Stress  What is stress?     Stress is your body's response to a hard situation. Your body can have a physical, emotional, or mental response. Stress is a fact of life for most people, and it affects everyone differently. What causes stress for you may not be stressful for someone else.  A lot of things can cause stress. You may feel stress when you go on a job interview, take a test, or run a race. This kind of short-term stress is normal and even useful. It can help you if you need to work hard or react quickly. For example, stress can help you finish an important job on time.  Long-term stress is caused by ongoing stressful situations or events. Examples of long-term stress include long-term health problems, ongoing problems at work, or conflicts in your family. Long-term stress can harm your health.  How does stress affect your health?  When you are stressed, your body responds as though you are in danger. It makes hormones that speed up your heart, make you breathe faster, and give you a burst of energy. This is called the fight-or-flight stress response. If the stress is over quickly, your body goes back to normal and no harm is done.  But if stress happens too often or lasts too long, it can have bad effects. Long-term stress can make you more likely to get sick, and it can make symptoms of some diseases worse. If you tense up when you are stressed, you may develop neck, shoulder, or low back pain. Stress is linked to high blood pressure and heart disease.  Stress also harms your emotional health. It can make you cantu, tense, or depressed. Your relationships may suffer, and you may not do well at work or school.  What can you do to manage stress?  You can try these things to help manage stress:   Do something active. Exercise or activity can help reduce stress. Walking is a great way to get started. Even everyday activities such as housecleaning or yard  work can help.  Try yoga or radha chi. These techniques combine exercise and meditation. You may need some training at first to learn them.  Do something you enjoy. For example, listen to music or go to a movie. Practice your hobby or do volunteer work.  Meditate. This can help you relax, because you are not worrying about what happened before or what may happen in the future.  Do guided imagery. Imagine yourself in any setting that helps you feel calm. You can use online videos, books, or a teacher to guide you.  Do breathing exercises. For example:  From a standing position, bend forward from the waist with your knees slightly bent. Let your arms dangle close to the floor.  Breathe in slowly and deeply as you return to a standing position. Roll up slowly and lift your head last.  Hold your breath for just a few seconds in the standing position.  Breathe out slowly and bend forward from the waist.  Let your feelings out. Talk, laugh, cry, and express anger when you need to. Talking with supportive friends or family, a counselor, or a nathan leader about your feelings is a healthy way to relieve stress. Avoid discussing your feelings with people who make you feel worse.  Write. It may help to write about things that are bothering you. This helps you find out how much stress you feel and what is causing it. When you know this, you can find better ways to cope.  What can you do to prevent stress?  You might try some of these things to help prevent stress:  Manage your time. This helps you find time to do the things you want and need to do.  Get enough sleep. Your body recovers from the stresses of the day while you are sleeping.  Get support. Your family, friends, and community can make a difference in how you experience stress.  Limit your news feed. Avoid or limit time on social media or news that may make you feel stressed.  Do something active. Exercise or activity can help reduce stress. Walking is a great way to  "get started.  Where can you learn more?  Go to https://www.[employer].net/patiented  Enter N032 in the search box to learn more about \"Learning About Stress.\"  Current as of: October 24, 2023  Content Version: 14.3    2024 [employer].   Care instructions adapted under license by your healthcare professional. If you have questions about a medical condition or this instruction, always ask your healthcare professional. [employer] disclaims any warranty or liability for your use of this information.    Substance Use Disorder: Care Instructions  Overview     You can improve your life and health by stopping your use of alcohol or drugs. When you don't drink or use drugs, you may feel and sleep better. You may get along better with your family, friends, and coworkers. There are medicines and programs that can help with substance use disorder.  How can you care for yourself at home?  Here are some ways to help you stay sober and prevent relapse.  If you have been given medicine to help keep you sober or reduce your cravings, be sure to take it exactly as prescribed.  Talk to your doctor about programs that can help you stop using drugs or drinking alcohol.  Do not keep alcohol or drugs in your home.  Plan ahead. Think about what you'll say if other people ask you to drink or use drugs. Try not to spend time with people who drink or use drugs.  Use the time and money spent on drinking or drugs to do something that's important to you.  Preventing a relapse  Have a plan to deal with relapse. Learn to recognize changes in your thinking that lead you to drink or use drugs. Get help before you start to drink or use drugs again.  Try to stay away from situations, friends, or places that may lead you to drink or use drugs.  If you feel the need to drink alcohol or use drugs again, seek help right away. Call a trusted friend or family member. Some people get support from organizations such as Narcotics " Anonymous or [facility] [employer] or from treatment facilities.  If you relapse, get help as soon as you can. Some people make a plan with another person that outlines what they want that person to do for them if they relapse. The plan usually includes how to handle the relapse and who to notify in case of relapse.  Don't give up. Remember that a relapse doesn't mean that you have failed. Use the experience to learn the triggers that lead you to drink or use drugs. Then quit again. Recovery is a lifelong process. Many people have several relapses before they are able to quit for good.  Follow-up care is a key part of your treatment and safety. Be sure to make and go to all appointments, and call your doctor if you are having problems. It's also a good idea to know your test results and keep a list of the medicines you take.  When should you call for help?   Call 911  anytime you think you may need emergency care. For example, call if you or someone else:    Has overdosed or has withdrawal signs. Be sure to tell the emergency workers that you are or someone else is using or trying to quit using drugs. Overdose or withdrawal signs may include:  Losing consciousness.  Seizure.  Seeing or hearing things that aren't there (hallucinations).     Is thinking or talking about suicide or harming others.   Where to get help 24 hours a day, 7 days a week   If you or someone you know talks about suicide, self-harm, a mental health crisis, a substance use crisis, or any other kind of emotional distress, get help right away. You can:    Call the Suicide and Crisis Lifeline at 988.     Call 3-503-357-TALK (1-481.366.4243).     Text HOME to 756728 to access the Crisis Text Line.   Consider saving these numbers in your phone.  Go to [employer]org for more information or to chat online.  Call your doctor now or seek immediate medical care if:    You are having withdrawal symptoms. These may include nausea or vomiting, sweating, shakiness,  "and anxiety.   Watch closely for changes in your health, and be sure to contact your doctor if:    You have a relapse.     You need more help or support to stop.   Where can you learn more?  Go to https://www.[employer].net/patiented  Enter H573 in the search box to learn more about \"Substance Use Disorder: Care Instructions.\"  Current as of: November 15, 2023  Content Version: 14.3    2024 1-800-DENTIST.   Care instructions adapted under license by your healthcare professional. If you have questions about a medical condition or this instruction, always ask your healthcare professional. 1-800-[employer]ENT[employer] disclaims any warranty or liability for your use of this information.       "

## 2025-02-06 ENCOUNTER — TRANSFERRED RECORDS (OUTPATIENT)
Dept: MULTI SPECIALTY CLINIC | Facility: CLINIC | Age: 55
End: 2025-02-06

## 2025-02-06 LAB — RETINOPATHY: NORMAL

## 2025-04-29 ENCOUNTER — TELEPHONE (OUTPATIENT)
Dept: FAMILY MEDICINE | Facility: CLINIC | Age: 55
End: 2025-04-29
Payer: COMMERCIAL

## 2025-04-29 DIAGNOSIS — E83.42 HYPOMAGNESEMIA: ICD-10-CM

## 2025-04-29 DIAGNOSIS — E11.9 TYPE 2 DIABETES MELLITUS WITHOUT COMPLICATION, WITHOUT LONG-TERM CURRENT USE OF INSULIN (H): ICD-10-CM

## 2025-04-29 DIAGNOSIS — E78.5 HYPERLIPIDEMIA LDL GOAL <70: Primary | ICD-10-CM

## 2025-04-29 NOTE — TELEPHONE ENCOUNTER
Patient needs labs done prior to his May clinic appointment    I put in orders    He can schedule a fasting lab appointment to be done a couple days prior    Please inform patient    Mando Barriga MD

## 2025-05-07 ENCOUNTER — LAB (OUTPATIENT)
Dept: LAB | Facility: CLINIC | Age: 55
End: 2025-05-07
Payer: COMMERCIAL

## 2025-05-07 DIAGNOSIS — E83.42 HYPOMAGNESEMIA: ICD-10-CM

## 2025-05-07 DIAGNOSIS — E11.9 TYPE 2 DIABETES MELLITUS WITHOUT COMPLICATION, WITHOUT LONG-TERM CURRENT USE OF INSULIN (H): ICD-10-CM

## 2025-05-07 DIAGNOSIS — E78.5 HYPERLIPIDEMIA LDL GOAL <70: ICD-10-CM

## 2025-05-07 LAB
ALBUMIN SERPL BCG-MCNC: 4.2 G/DL (ref 3.5–5.2)
ALP SERPL-CCNC: 70 U/L (ref 40–150)
ALT SERPL W P-5'-P-CCNC: 25 U/L (ref 0–70)
ANION GAP SERPL CALCULATED.3IONS-SCNC: 11 MMOL/L (ref 7–15)
AST SERPL W P-5'-P-CCNC: 30 U/L (ref 0–45)
BILIRUB SERPL-MCNC: 0.5 MG/DL
BUN SERPL-MCNC: 15.6 MG/DL (ref 6–20)
CALCIUM SERPL-MCNC: 9.4 MG/DL (ref 8.8–10.4)
CHLORIDE SERPL-SCNC: 105 MMOL/L (ref 98–107)
CHOLEST SERPL-MCNC: 152 MG/DL
CREAT SERPL-MCNC: 1.17 MG/DL (ref 0.67–1.17)
CREAT UR-MCNC: 274 MG/DL
EGFRCR SERPLBLD CKD-EPI 2021: 74 ML/MIN/1.73M2
EST. AVERAGE GLUCOSE BLD GHB EST-MCNC: 148 MG/DL
FASTING STATUS PATIENT QL REPORTED: YES
FASTING STATUS PATIENT QL REPORTED: YES
GLUCOSE SERPL-MCNC: 141 MG/DL (ref 70–99)
HBA1C MFR BLD: 6.8 % (ref 0–5.6)
HCO3 SERPL-SCNC: 26 MMOL/L (ref 22–29)
HDLC SERPL-MCNC: 40 MG/DL
LDLC SERPL CALC-MCNC: 75 MG/DL
MAGNESIUM SERPL-MCNC: 2.1 MG/DL (ref 1.7–2.3)
MICROALBUMIN UR-MCNC: <12 MG/L
MICROALBUMIN/CREAT UR: NORMAL MG/G{CREAT}
NONHDLC SERPL-MCNC: 112 MG/DL
POTASSIUM SERPL-SCNC: 4.5 MMOL/L (ref 3.4–5.3)
PROT SERPL-MCNC: 6.9 G/DL (ref 6.4–8.3)
SODIUM SERPL-SCNC: 142 MMOL/L (ref 135–145)
TRIGL SERPL-MCNC: 185 MG/DL

## 2025-05-07 PROCEDURE — 80061 LIPID PANEL: CPT

## 2025-05-07 PROCEDURE — 82570 ASSAY OF URINE CREATININE: CPT

## 2025-05-07 PROCEDURE — 82043 UR ALBUMIN QUANTITATIVE: CPT

## 2025-05-07 PROCEDURE — 80053 COMPREHEN METABOLIC PANEL: CPT

## 2025-05-07 PROCEDURE — 83036 HEMOGLOBIN GLYCOSYLATED A1C: CPT

## 2025-05-07 PROCEDURE — 83735 ASSAY OF MAGNESIUM: CPT

## 2025-05-07 PROCEDURE — 36415 COLL VENOUS BLD VENIPUNCTURE: CPT

## 2025-05-08 ENCOUNTER — RESULTS FOLLOW-UP (OUTPATIENT)
Dept: FAMILY MEDICINE | Facility: CLINIC | Age: 55
End: 2025-05-08

## 2025-05-09 PROBLEM — E11.9 TYPE 2 DIABETES MELLITUS WITHOUT COMPLICATION, WITHOUT LONG-TERM CURRENT USE OF INSULIN (H): Status: ACTIVE | Noted: 2025-05-09

## 2025-07-13 ENCOUNTER — PATIENT OUTREACH (OUTPATIENT)
Dept: CARE COORDINATION | Facility: CLINIC | Age: 55
End: 2025-07-13
Payer: COMMERCIAL

## 2025-08-18 ENCOUNTER — TRANSFERRED RECORDS (OUTPATIENT)
Dept: HEALTH INFORMATION MANAGEMENT | Facility: CLINIC | Age: 55
End: 2025-08-18
Payer: COMMERCIAL